# Patient Record
Sex: FEMALE | Race: WHITE
[De-identification: names, ages, dates, MRNs, and addresses within clinical notes are randomized per-mention and may not be internally consistent; named-entity substitution may affect disease eponyms.]

---

## 2017-01-26 ENCOUNTER — HOSPITAL ENCOUNTER (OUTPATIENT)
Dept: HOSPITAL 53 - M SMT | Age: 57
End: 2017-01-26
Attending: INTERNAL MEDICINE
Payer: COMMERCIAL

## 2017-01-26 DIAGNOSIS — Z94.0: Primary | ICD-10-CM

## 2017-01-26 DIAGNOSIS — N18.3: ICD-10-CM

## 2017-01-26 LAB
ALBUMIN SERPL BCG-MCNC: 3.8 GM/DL (ref 3.2–5.2)
ANION GAP SERPL CALC-SCNC: 6 MEQ/L (ref 8–16)
BASOPHILS # BLD AUTO: 0 K/MM3 (ref 0–0.2)
BASOPHILS NFR BLD AUTO: 1 % (ref 0–1)
BUN SERPL-MCNC: 24 MG/DL (ref 7–18)
CALCIUM SERPL-MCNC: 8.6 MG/DL (ref 8.5–10.1)
CHLORIDE SERPL-SCNC: 106 MEQ/L (ref 98–107)
CO2 SERPL-SCNC: 29 MEQ/L (ref 21–32)
CREAT SERPL-MCNC: 1.23 MG/DL (ref 0.55–1.02)
EOSINOPHIL # BLD AUTO: 0.1 K/MM3 (ref 0–0.5)
EOSINOPHIL NFR BLD AUTO: 3.3 % (ref 0–3)
ERYTHROCYTE [DISTWIDTH] IN BLOOD BY AUTOMATED COUNT: 13.7 % (ref 11.5–14.5)
GFR SERPL CREATININE-BSD FRML MDRD: 48.3 ML/MIN/{1.73_M2} (ref 51–?)
LARGE UNSTAINED CELL #: 0.1 K/MM3 (ref 0–0.4)
LARGE UNSTAINED CELL %: 2.9 % (ref 0–4)
LYMPHOCYTES # BLD AUTO: 0.8 K/MM3 (ref 1.5–4.5)
LYMPHOCYTES NFR BLD AUTO: 36.1 % (ref 24–44)
MCH RBC QN AUTO: 27.2 PG (ref 27–33)
MCHC RBC AUTO-ENTMCNC: 33 G/DL (ref 32–36.5)
MCV RBC AUTO: 82.4 FL (ref 80–96)
MONOCYTES # BLD AUTO: 0.1 K/MM3 (ref 0–0.8)
MONOCYTES NFR BLD AUTO: 4.9 % (ref 0–5)
NEUTROPHILS # BLD AUTO: 1.1 K/MM3 (ref 1.8–7.7)
NEUTROPHILS NFR BLD AUTO: 51.8 % (ref 36–66)
PHOSPHATE SERPL-MCNC: 3.4 MG/DL (ref 2.5–4.9)
PLATELET # BLD AUTO: 153 K/MM3 (ref 150–450)
POTASSIUM SERPL-SCNC: 4.4 MEQ/L (ref 3.5–5.1)
SODIUM SERPL-SCNC: 141 MEQ/L (ref 136–145)

## 2017-01-27 LAB
GLUCOSE SERPL-MCNC: 112 MG/DL (ref 70–105)
WBC # BLD AUTO: 2.2 K/MM3 (ref 4–10)

## 2017-03-23 ENCOUNTER — HOSPITAL ENCOUNTER (OUTPATIENT)
Dept: HOSPITAL 53 - M SMT | Age: 57
End: 2017-03-23
Attending: INTERNAL MEDICINE
Payer: COMMERCIAL

## 2017-03-23 DIAGNOSIS — N18.3: Primary | ICD-10-CM

## 2017-03-23 DIAGNOSIS — Z94.0: ICD-10-CM

## 2017-03-23 LAB
ALBUMIN SERPL BCG-MCNC: 3.9 GM/DL (ref 3.2–5.2)
ANION GAP SERPL CALC-SCNC: 6 MEQ/L (ref 8–16)
BASOPHILS # BLD AUTO: 0 K/MM3 (ref 0–0.2)
BASOPHILS NFR BLD AUTO: 0.3 % (ref 0–1)
BUN SERPL-MCNC: 25 MG/DL (ref 7–18)
CALCIUM SERPL-MCNC: 8.8 MG/DL (ref 8.5–10.1)
CHLORIDE SERPL-SCNC: 105 MEQ/L (ref 98–107)
CO2 SERPL-SCNC: 30 MEQ/L (ref 21–32)
CREAT SERPL-MCNC: 1.34 MG/DL (ref 0.55–1.02)
EOSINOPHIL # BLD AUTO: 0.1 K/MM3 (ref 0–0.5)
EOSINOPHIL NFR BLD AUTO: 3 % (ref 0–3)
ERYTHROCYTE [DISTWIDTH] IN BLOOD BY AUTOMATED COUNT: 12.9 % (ref 11.5–14.5)
GFR SERPL CREATININE-BSD FRML MDRD: 43.7 ML/MIN/{1.73_M2} (ref 51–?)
GLUCOSE SERPL-MCNC: 110 MG/DL (ref 70–105)
LARGE UNSTAINED CELL #: 0.1 K/MM3 (ref 0–0.4)
LARGE UNSTAINED CELL %: 4.7 % (ref 0–4)
LYMPHOCYTES # BLD AUTO: 1.1 K/MM3 (ref 1.5–4.5)
LYMPHOCYTES NFR BLD AUTO: 41.1 % (ref 24–44)
MCH RBC QN AUTO: 27.4 PG (ref 27–33)
MCHC RBC AUTO-ENTMCNC: 32 G/DL (ref 32–36.5)
MCV RBC AUTO: 85.7 FL (ref 80–96)
MONOCYTES # BLD AUTO: 0.2 K/MM3 (ref 0–0.8)
MONOCYTES NFR BLD AUTO: 5.9 % (ref 0–5)
NEUTROPHILS # BLD AUTO: 1.1 K/MM3 (ref 1.8–7.7)
NEUTROPHILS NFR BLD AUTO: 45 % (ref 36–66)
PHOSPHATE SERPL-MCNC: 3.8 MG/DL (ref 2.5–4.9)
PLATELET # BLD AUTO: 178 K/MM3 (ref 150–450)
POTASSIUM SERPL-SCNC: 4.8 MEQ/L (ref 3.5–5.1)
SODIUM SERPL-SCNC: 141 MEQ/L (ref 136–145)
WBC # BLD AUTO: 2.5 K/MM3 (ref 4–10)

## 2017-05-01 ENCOUNTER — HOSPITAL ENCOUNTER (OUTPATIENT)
Dept: HOSPITAL 53 - M SMT | Age: 57
End: 2017-05-01
Attending: INTERNAL MEDICINE
Payer: COMMERCIAL

## 2017-05-01 DIAGNOSIS — N18.3: Primary | ICD-10-CM

## 2017-05-01 DIAGNOSIS — Z94.0: ICD-10-CM

## 2017-05-01 LAB
ALBUMIN SERPL BCG-MCNC: 3.9 GM/DL (ref 3.2–5.2)
ANION GAP SERPL CALC-SCNC: 7 MEQ/L (ref 8–16)
BASOPHILS # BLD AUTO: 0 K/MM3 (ref 0–0.2)
BASOPHILS NFR BLD AUTO: 0.2 % (ref 0–1)
BUN SERPL-MCNC: 23 MG/DL (ref 7–18)
CALCIUM SERPL-MCNC: 8.3 MG/DL (ref 8.5–10.1)
CHLORIDE SERPL-SCNC: 106 MEQ/L (ref 98–107)
CO2 SERPL-SCNC: 27 MEQ/L (ref 21–32)
CREAT SERPL-MCNC: 1.48 MG/DL (ref 0.55–1.02)
EOSINOPHIL # BLD AUTO: 0 K/MM3 (ref 0–0.5)
EOSINOPHIL NFR BLD AUTO: 1.8 % (ref 0–3)
ERYTHROCYTE [DISTWIDTH] IN BLOOD BY AUTOMATED COUNT: 13.3 % (ref 11.5–14.5)
GFR SERPL CREATININE-BSD FRML MDRD: 39 ML/MIN/{1.73_M2} (ref 51–?)
GLUCOSE SERPL-MCNC: 105 MG/DL (ref 70–105)
LARGE UNSTAINED CELL #: 0.1 K/MM3 (ref 0–0.4)
LARGE UNSTAINED CELL %: 2.7 % (ref 0–4)
LYMPHOCYTES # BLD AUTO: 0.8 K/MM3 (ref 1.5–4.5)
LYMPHOCYTES NFR BLD AUTO: 34.4 % (ref 24–44)
MCH RBC QN AUTO: 29 PG (ref 27–33)
MCHC RBC AUTO-ENTMCNC: 33.5 G/DL (ref 32–36.5)
MCV RBC AUTO: 86.6 FL (ref 80–96)
MONOCYTES # BLD AUTO: 0.2 K/MM3 (ref 0–0.8)
MONOCYTES NFR BLD AUTO: 6.5 % (ref 0–5)
NEUTROPHILS # BLD AUTO: 1.2 K/MM3 (ref 1.8–7.7)
NEUTROPHILS NFR BLD AUTO: 54.5 % (ref 36–66)
PHOSPHATE SERPL-MCNC: 3.5 MG/DL (ref 2.5–4.9)
PLATELET # BLD AUTO: 146 K/MM3 (ref 150–450)
POTASSIUM SERPL-SCNC: 4.3 MEQ/L (ref 3.5–5.1)
SODIUM SERPL-SCNC: 140 MEQ/L (ref 136–145)
WBC # BLD AUTO: 2.3 K/MM3 (ref 4–10)

## 2017-06-29 ENCOUNTER — HOSPITAL ENCOUNTER (OUTPATIENT)
Dept: HOSPITAL 53 - M SMT | Age: 57
End: 2017-06-29
Attending: INTERNAL MEDICINE
Payer: COMMERCIAL

## 2017-06-29 DIAGNOSIS — N18.3: Primary | ICD-10-CM

## 2017-06-29 DIAGNOSIS — Z94.0: ICD-10-CM

## 2017-06-29 LAB
ADD MANUAL DIFFER: YES
ALBUMIN SERPL BCG-MCNC: 3.9 GM/DL (ref 3.2–5.2)
ANION GAP SERPL CALC-SCNC: 4 MEQ/L (ref 8–16)
BUN SERPL-MCNC: 25 MG/DL (ref 7–18)
CALCIUM SERPL-MCNC: 8.8 MG/DL (ref 8.5–10.1)
CHLORIDE SERPL-SCNC: 103 MEQ/L (ref 98–107)
CO2 SERPL-SCNC: 32 MEQ/L (ref 21–32)
CREAT SERPL-MCNC: 1.27 MG/DL (ref 0.55–1.02)
EOSINOPHIL NFR BLD MANUAL: 1 % (ref 0–5)
ERYTHROCYTE [DISTWIDTH] IN BLOOD BY AUTOMATED COUNT: 12.8 % (ref 11.5–14.5)
GFR SERPL CREATININE-BSD FRML MDRD: 46.5 ML/MIN/{1.73_M2} (ref 51–?)
GLUCOSE SERPL-MCNC: 84 MG/DL (ref 70–105)
MCH RBC QN AUTO: 28.5 PG (ref 27–33)
MCHC RBC AUTO-ENTMCNC: 33.3 G/DL (ref 32–36.5)
MCV RBC AUTO: 85.6 FL (ref 80–96)
PHOSPHATE SERPL-MCNC: 3.2 MG/DL (ref 2.5–4.9)
PLATELET # BLD AUTO: 172 K/MM3 (ref 150–450)
POTASSIUM SERPL-SCNC: 4.9 MEQ/L (ref 3.5–5.1)
RBC MORPH BLD: NORMAL
SODIUM SERPL-SCNC: 139 MEQ/L (ref 136–145)
WBC # BLD AUTO: 5.1 K/MM3 (ref 4–10)

## 2017-07-27 ENCOUNTER — HOSPITAL ENCOUNTER (OUTPATIENT)
Dept: HOSPITAL 53 - M SMT | Age: 57
End: 2017-07-27
Attending: INTERNAL MEDICINE
Payer: COMMERCIAL

## 2017-07-27 DIAGNOSIS — N18.3: Primary | ICD-10-CM

## 2017-07-27 DIAGNOSIS — Z94.0: ICD-10-CM

## 2017-07-27 LAB
ADD MANUAL DIFFER: YES
ALBUMIN SERPL BCG-MCNC: 3.7 GM/DL (ref 3.2–5.2)
ANION GAP SERPL CALC-SCNC: 5 MEQ/L (ref 8–16)
BUN SERPL-MCNC: 27 MG/DL (ref 7–18)
CALCIUM SERPL-MCNC: 8.6 MG/DL (ref 8.5–10.1)
CHLORIDE SERPL-SCNC: 101 MEQ/L (ref 98–107)
CO2 SERPL-SCNC: 30 MEQ/L (ref 21–32)
CREAT SERPL-MCNC: 1.36 MG/DL (ref 0.55–1.02)
DIFF SLIDE NUMBER: 154
EOSINOPHIL NFR BLD MANUAL: 3 % (ref 0–5)
ERYTHROCYTE [DISTWIDTH] IN BLOOD BY AUTOMATED COUNT: 12.9 % (ref 11.5–14.5)
GFR SERPL CREATININE-BSD FRML MDRD: 43 ML/MIN/{1.73_M2} (ref 51–?)
GLUCOSE SERPL-MCNC: 95 MG/DL (ref 70–105)
MCH RBC QN AUTO: 28.1 PG (ref 27–33)
MCHC RBC AUTO-ENTMCNC: 33 G/DL (ref 32–36.5)
MCV RBC AUTO: 85.1 FL (ref 80–96)
PHOSPHATE SERPL-MCNC: 3.6 MG/DL (ref 2.5–4.9)
PLATELET # BLD AUTO: 183 K/MM3 (ref 150–450)
POTASSIUM SERPL-SCNC: 4.9 MEQ/L (ref 3.5–5.1)
RBC MORPH BLD: NORMAL
SODIUM SERPL-SCNC: 136 MEQ/L (ref 136–145)
WBC # BLD AUTO: 2.6 K/MM3 (ref 4–10)

## 2017-09-21 ENCOUNTER — HOSPITAL ENCOUNTER (OUTPATIENT)
Dept: HOSPITAL 53 - M SMT | Age: 57
End: 2017-09-21
Attending: INTERNAL MEDICINE
Payer: COMMERCIAL

## 2017-09-21 DIAGNOSIS — N18.3: Primary | ICD-10-CM

## 2017-09-21 DIAGNOSIS — Z94.0: ICD-10-CM

## 2017-09-21 LAB
ALBUMIN SERPL BCG-MCNC: 3.7 GM/DL (ref 3.2–5.2)
ANION GAP SERPL CALC-SCNC: 6 MEQ/L (ref 8–16)
BASOPHILS # BLD AUTO: 0 K/MM3 (ref 0–0.2)
BASOPHILS NFR BLD AUTO: 0.7 % (ref 0–1)
BUN SERPL-MCNC: 26 MG/DL (ref 7–18)
CALCIUM SERPL-MCNC: 8.6 MG/DL (ref 8.5–10.1)
CHLORIDE SERPL-SCNC: 103 MEQ/L (ref 98–107)
CO2 SERPL-SCNC: 28 MEQ/L (ref 21–32)
CREAT SERPL-MCNC: 1.29 MG/DL (ref 0.55–1.02)
EOSINOPHIL # BLD AUTO: 0.1 K/MM3 (ref 0–0.5)
EOSINOPHIL NFR BLD AUTO: 3.1 % (ref 0–3)
ERYTHROCYTE [DISTWIDTH] IN BLOOD BY AUTOMATED COUNT: 13.1 % (ref 11.5–14.5)
GFR SERPL CREATININE-BSD FRML MDRD: 45.7 ML/MIN/{1.73_M2} (ref 51–?)
GLUCOSE SERPL-MCNC: 104 MG/DL (ref 70–105)
LARGE UNSTAINED CELL #: 0.1 K/MM3 (ref 0–0.4)
LARGE UNSTAINED CELL %: 4.3 % (ref 0–4)
LYMPHOCYTES # BLD AUTO: 0.9 K/MM3 (ref 1.5–4.5)
LYMPHOCYTES NFR BLD AUTO: 38.8 % (ref 24–44)
MCH RBC QN AUTO: 28.7 PG (ref 27–33)
MCHC RBC AUTO-ENTMCNC: 33.8 G/DL (ref 32–36.5)
MCV RBC AUTO: 85 FL (ref 80–96)
MONOCYTES # BLD AUTO: 0.1 K/MM3 (ref 0–0.8)
MONOCYTES NFR BLD AUTO: 6.2 % (ref 0–5)
NEUTROPHILS # BLD AUTO: 1.1 K/MM3 (ref 1.8–7.7)
NEUTROPHILS NFR BLD AUTO: 46.9 % (ref 36–66)
PHOSPHATE SERPL-MCNC: 2.9 MG/DL (ref 2.5–4.9)
PLATELET # BLD AUTO: 174 K/MM3 (ref 150–450)
POTASSIUM SERPL-SCNC: 5 MEQ/L (ref 3.5–5.1)
SODIUM SERPL-SCNC: 137 MEQ/L (ref 136–145)
WBC # BLD AUTO: 2.2 K/MM3 (ref 4–10)

## 2017-11-16 ENCOUNTER — HOSPITAL ENCOUNTER (OUTPATIENT)
Dept: HOSPITAL 53 - M LAB REF | Age: 57
End: 2017-11-16
Attending: INTERNAL MEDICINE
Payer: COMMERCIAL

## 2017-11-16 DIAGNOSIS — Z94.0: Primary | ICD-10-CM

## 2017-11-16 DIAGNOSIS — E03.9: ICD-10-CM

## 2017-11-16 LAB — T4 FREE SERPL-MCNC: 0.97 NG/DL (ref 0.76–1.46)

## 2018-01-11 ENCOUNTER — HOSPITAL ENCOUNTER (OUTPATIENT)
Dept: HOSPITAL 53 - M SMT | Age: 58
End: 2018-01-11
Attending: INTERNAL MEDICINE
Payer: COMMERCIAL

## 2018-01-11 DIAGNOSIS — Z94.0: Primary | ICD-10-CM

## 2018-01-11 DIAGNOSIS — N18.3: ICD-10-CM

## 2018-01-11 LAB
ALBUMIN: 3.8 GM/DL (ref 3.2–5.2)
ANION GAP: 9 MEQ/L (ref 8–16)
APPEARANCE, URINE: CLEAR
BACTERIA UR QL AUTO: NEGATIVE
BASO #: 0 10^3/UL (ref 0–0.2)
BASO %: 0.6 % (ref 0–1)
BILIRUBIN, URINE AUTO: NEGATIVE
BLOOD UREA NITROGEN: 31 MG/DL (ref 7–18)
BLOOD, URINE BLOOD: NEGATIVE
CALCIUM LEVEL: 8.7 MG/DL (ref 8.5–10.1)
CARBON DIOXIDE LEVEL: 27 MEQ/L (ref 21–32)
CHLORIDE LEVEL: 104 MEQ/L (ref 98–107)
CREATININE FOR GFR: 1.39 MG/DL (ref 0.55–1.02)
EOS #: 0.1 10^3/UL (ref 0–0.5)
EOSINOPHIL NFR BLD AUTO: 2.3 % (ref 0–3)
GFR SERPL CREATININE-BSD FRML MDRD: 41.8 ML/MIN/{1.73_M2} (ref 51–?)
GLUCOSE, FASTING: 102 MG/DL (ref 70–105)
GLUCOSE, URINE (UA) AUTO: NEGATIVE MG/DL
HEMATOCRIT: 36.9 % (ref 36–47)
HEMOGLOBIN: 11.9 G/DL (ref 12–16)
IMMATURE GRANULOCYTE #: 0 10^3/UL (ref 0–0)
IMMATURE GRANULOCYTE %: 0.3 % (ref 0–0)
KETONE, URINE AUTO: NEGATIVE MG/DL
LEUKOCYTE ESTERASE UR QL STRIP.AUTO: NEGATIVE
LYMPH #: 1.8 10^3/UL (ref 1.5–4.5)
LYMPH %: 52.9 % (ref 24–44)
MEAN CORPUSCULAR HEMOGLOBIN: 27.7 PG (ref 27–33)
MEAN CORPUSCULAR HGB CONC: 32.2 G/DL (ref 32–36.5)
MEAN CORPUSCULAR VOLUME: 86 FL (ref 80–96)
MONO #: 0.3 10^3/UL (ref 0–0.8)
MONO %: 7.8 % (ref 0–5)
NEUTROPHILS #: 1.2 10^3/UL (ref 1.8–7.7)
NEUTROPHILS %: 36.1 % (ref 36–66)
NITRITE, URINE AUTO: NEGATIVE
NRBC BLD AUTO-RTO: 0 % (ref 0–0)
PH,URINE: 6 UNITS (ref 5–9)
PHOSPHORUS LEVEL: 3.5 MG/DL (ref 2.5–4.9)
PLATELET COUNT, AUTOMATED: 186 10^3/UL (ref 150–450)
POTASSIUM SERUM: 4.8 MEQ/L (ref 3.5–5.1)
PROT UR QL STRIP.AUTO: NEGATIVE MG/DL
RBC, URINE AUTO: 1 /HPF (ref 0–3)
RED BLOOD COUNT: 4.29 10^6/UL (ref 4–5.4)
RED CELL DISTRIBUTION WIDTH: 13 % (ref 11.5–14.5)
SODIUM LEVEL: 140 MEQ/L (ref 136–145)
SPECIFIC GRAVITY URINE AUTO: 1.02 (ref 1–1.03)
SQUAMOUS #/AREA URNS AUTO: 0 /HPF (ref 0–6)
UROBILINOGEN, URINE AUTO: 0.2 MG/DL (ref 0–2)
WBC, URINE AUTO: 0 /HPF (ref 0–3)
WHITE BLOOD COUNT: 3.4 10^3/UL (ref 4–10)

## 2018-01-11 PROCEDURE — 80069 RENAL FUNCTION PANEL: CPT

## 2018-01-13 LAB — FK 506 (TACROLIMUS) LABCORP: 6.5 NG/ML (ref 2–20)

## 2018-02-15 ENCOUNTER — HOSPITAL ENCOUNTER (OUTPATIENT)
Dept: HOSPITAL 53 - M LAB REF | Age: 58
End: 2018-02-15
Attending: INTERNAL MEDICINE
Payer: COMMERCIAL

## 2018-02-15 DIAGNOSIS — Z94.0: Primary | ICD-10-CM

## 2018-02-17 LAB — FK 506 (TACROLIMUS) LABCORP: 7 NG/ML (ref 2–20)

## 2018-03-22 ENCOUNTER — HOSPITAL ENCOUNTER (OUTPATIENT)
Dept: HOSPITAL 53 - M SMT | Age: 58
End: 2018-03-22
Attending: INTERNAL MEDICINE
Payer: COMMERCIAL

## 2018-03-22 DIAGNOSIS — N18.3: Primary | ICD-10-CM

## 2018-03-22 DIAGNOSIS — Z94.0: ICD-10-CM

## 2018-03-22 LAB
ALBUMIN: 3.6 GM/DL (ref 3.2–5.2)
ANION GAP: 7 MEQ/L (ref 8–16)
APPEARANCE, URINE: CLEAR
BACTERIA UR QL AUTO: NEGATIVE
BASO #: 0 10^3/UL (ref 0–0.2)
BASO %: 1.1 % (ref 0–1)
BILIRUBIN, URINE AUTO: NEGATIVE
BLOOD UREA NITROGEN: 25 MG/DL (ref 7–18)
BLOOD, URINE BLOOD: NEGATIVE
CALCIUM LEVEL: 8.2 MG/DL (ref 8.5–10.1)
CARBON DIOXIDE LEVEL: 28 MEQ/L (ref 21–32)
CHLORIDE LEVEL: 104 MEQ/L (ref 98–107)
CREATININE FOR GFR: 1.27 MG/DL (ref 0.55–1.3)
EOS #: 0.1 10^3/UL (ref 0–0.5)
EOSINOPHIL NFR BLD AUTO: 2.8 % (ref 0–3)
GFR SERPL CREATININE-BSD FRML MDRD: 46.3 ML/MIN/{1.73_M2} (ref 51–?)
GLUCOSE, FASTING: 96 MG/DL (ref 70–100)
GLUCOSE, URINE (UA) AUTO: NEGATIVE MG/DL
HEMATOCRIT: 36.6 % (ref 36–47)
HEMOGLOBIN: 11.4 G/DL (ref 12–16)
IMMATURE GRANULOCYTE %: 0.4 % (ref 0–3)
KETONE, URINE AUTO: NEGATIVE MG/DL
LEUKOCYTE ESTERASE UR QL STRIP.AUTO: NEGATIVE
LYMPH #: 0.9 10^3/UL (ref 1.5–4.5)
LYMPH %: 32.9 % (ref 24–44)
MEAN CORPUSCULAR HEMOGLOBIN: 26.6 PG (ref 27–33)
MEAN CORPUSCULAR HGB CONC: 31.1 G/DL (ref 32–36.5)
MEAN CORPUSCULAR VOLUME: 85.5 FL (ref 80–96)
MONO #: 0.2 10^3/UL (ref 0–0.8)
MONO %: 6.7 % (ref 0–5)
NEUTROPHILS #: 1.6 10^3/UL (ref 1.8–7.7)
NEUTROPHILS %: 56.1 % (ref 36–66)
NITRITE, URINE AUTO: NEGATIVE
NRBC BLD AUTO-RTO: 0 % (ref 0–0)
PH,URINE: 5 UNITS (ref 5–9)
PHOSPHORUS LEVEL: 2.9 MG/DL (ref 2.5–4.9)
PLATELET COUNT, AUTOMATED: 197 10^3/UL (ref 150–450)
POTASSIUM SERUM: 4.3 MEQ/L (ref 3.5–5.1)
PROT UR QL STRIP.AUTO: NEGATIVE MG/DL
RBC, URINE AUTO: 0 /HPF (ref 0–3)
RED BLOOD COUNT: 4.28 10^6/UL (ref 4–5.4)
RED CELL DISTRIBUTION WIDTH: 13.4 % (ref 11.5–14.5)
SODIUM LEVEL: 139 MEQ/L (ref 136–145)
SPECIFIC GRAVITY URINE AUTO: 1 (ref 1–1.03)
SQUAMOUS #/AREA URNS AUTO: 0 /HPF (ref 0–6)
UROBILINOGEN, URINE AUTO: 0.2 MG/DL (ref 0–2)
WBC, URINE AUTO: 0 /HPF (ref 0–3)
WHITE BLOOD COUNT: 2.8 10^3/UL (ref 4–10)

## 2018-03-22 PROCEDURE — 80069 RENAL FUNCTION PANEL: CPT

## 2018-03-25 LAB — FK 506 (TACROLIMUS) LABCORP: 4.8 NG/ML (ref 2–20)

## 2018-05-21 ENCOUNTER — HOSPITAL ENCOUNTER (OUTPATIENT)
Dept: HOSPITAL 53 - M SMT | Age: 58
End: 2018-05-21
Attending: INTERNAL MEDICINE
Payer: COMMERCIAL

## 2018-05-21 DIAGNOSIS — Z94.0: Primary | ICD-10-CM

## 2018-05-21 DIAGNOSIS — D63.1: ICD-10-CM

## 2018-05-21 LAB
ALBUMIN: 3.8 GM/DL (ref 3.2–5.2)
ANION GAP: 8 MEQ/L (ref 8–16)
APPEARANCE, URINE: CLEAR
BACTERIA UR QL AUTO: NEGATIVE
BASO #: 0 10^3/UL (ref 0–0.2)
BASO %: 1.1 % (ref 0–1)
BILIRUBIN, URINE AUTO: NEGATIVE
BLOOD UREA NITROGEN: 27 MG/DL (ref 7–18)
BLOOD, URINE BLOOD: NEGATIVE
CALCIUM LEVEL: 8.4 MG/DL (ref 8.5–10.1)
CARBON DIOXIDE LEVEL: 27 MEQ/L (ref 21–32)
CHLORIDE LEVEL: 104 MEQ/L (ref 98–107)
CREATININE FOR GFR: 1.39 MG/DL (ref 0.55–1.3)
EOS #: 0.1 10^3/UL (ref 0–0.5)
EOSINOPHIL NFR BLD AUTO: 2.2 % (ref 0–3)
GFR SERPL CREATININE-BSD FRML MDRD: 41.8 ML/MIN/{1.73_M2} (ref 51–?)
GLUCOSE, FASTING: 105 MG/DL (ref 70–100)
GLUCOSE, URINE (UA) AUTO: NEGATIVE MG/DL
HEMATOCRIT: 36.4 % (ref 36–47)
HEMOGLOBIN: 11.6 G/DL (ref 12–15.5)
IMMATURE GRANULOCYTE %: 0 % (ref 0–3)
KETONE, URINE AUTO: NEGATIVE MG/DL
LEUKOCYTE ESTERASE UR QL STRIP.AUTO: NEGATIVE
LYMPH #: 0.9 10^3/UL (ref 1.5–4.5)
LYMPH %: 33.5 % (ref 24–44)
MEAN CORPUSCULAR HEMOGLOBIN: 27.2 PG (ref 27–33)
MEAN CORPUSCULAR HGB CONC: 31.9 G/DL (ref 32–36.5)
MEAN CORPUSCULAR VOLUME: 85.2 FL (ref 80–96)
MONO #: 0.2 10^3/UL (ref 0–0.8)
MONO %: 8.6 % (ref 0–5)
MUCUS, URINE: (no result)
NEUTROPHILS #: 1.5 10^3/UL (ref 1.8–7.7)
NEUTROPHILS %: 54.6 % (ref 36–66)
NITRITE, URINE AUTO: NEGATIVE
NRBC BLD AUTO-RTO: 0 % (ref 0–0)
PH,URINE: 5 UNITS (ref 5–9)
PHOSPHORUS LEVEL: 3.4 MG/DL (ref 2.5–4.9)
PLATELET COUNT, AUTOMATED: 170 10^3/UL (ref 150–450)
POTASSIUM SERUM: 4.4 MEQ/L (ref 3.5–5.1)
PROT UR QL STRIP.AUTO: NEGATIVE MG/DL
RBC, URINE AUTO: 1 /HPF (ref 0–3)
RED BLOOD COUNT: 4.27 10^6/UL (ref 4–5.4)
RED CELL DISTRIBUTION WIDTH: 13.1 % (ref 11.5–14.5)
SODIUM LEVEL: 139 MEQ/L (ref 136–145)
SPECIFIC GRAVITY URINE AUTO: 1.01 (ref 1–1.03)
SQUAMOUS #/AREA URNS AUTO: 0 /HPF (ref 0–6)
UROBILINOGEN, URINE AUTO: 0.2 MG/DL (ref 0–2)
WBC, URINE AUTO: 0 /HPF (ref 0–3)
WHITE BLOOD COUNT: 2.7 10^3/UL (ref 4–10)

## 2018-05-21 PROCEDURE — 80069 RENAL FUNCTION PANEL: CPT

## 2018-05-23 LAB — FK 506 (TACROLIMUS) LABCORP: 6.1 NG/ML (ref 2–20)

## 2018-07-09 ENCOUNTER — HOSPITAL ENCOUNTER (OUTPATIENT)
Dept: HOSPITAL 53 - M SMT | Age: 58
End: 2018-07-09
Attending: INTERNAL MEDICINE
Payer: COMMERCIAL

## 2018-07-09 DIAGNOSIS — Z94.0: Primary | ICD-10-CM

## 2018-07-09 DIAGNOSIS — D63.1: ICD-10-CM

## 2018-07-09 LAB
ALBUMIN: 3.8 GM/DL (ref 3.2–5.2)
ANION GAP: 8 MEQ/L (ref 8–16)
APPEARANCE, URINE: CLEAR
BACTERIA UR QL AUTO: NEGATIVE
BASO #: 0 10^3/UL (ref 0–0.2)
BASO %: 1.1 % (ref 0–1)
BILIRUBIN, URINE AUTO: NEGATIVE
BLOOD UREA NITROGEN: 31 MG/DL (ref 7–18)
BLOOD, URINE BLOOD: NEGATIVE
CALCIUM LEVEL: 8.6 MG/DL (ref 8.5–10.1)
CARBON DIOXIDE LEVEL: 27 MEQ/L (ref 21–32)
CHLORIDE LEVEL: 107 MEQ/L (ref 98–107)
CREATININE FOR GFR: 1.44 MG/DL (ref 0.55–1.3)
EOS #: 0.1 10^3/UL (ref 0–0.5)
EOSINOPHIL NFR BLD AUTO: 2.7 % (ref 0–3)
GFR SERPL CREATININE-BSD FRML MDRD: 40.1 ML/MIN/{1.73_M2} (ref 51–?)
GLUCOSE, FASTING: 111 MG/DL (ref 70–100)
GLUCOSE, URINE (UA) AUTO: NEGATIVE MG/DL
HEMATOCRIT: 35.3 % (ref 36–47)
HEMOGLOBIN: 11.5 G/DL (ref 12–15.5)
IMMATURE GRANULOCYTE %: 0 % (ref 0–3)
KETONE, URINE AUTO: NEGATIVE MG/DL
LEUKOCYTE ESTERASE UR QL STRIP.AUTO: NEGATIVE
LYMPH #: 0.9 10^3/UL (ref 1.5–4.5)
LYMPH %: 33.3 % (ref 24–44)
MEAN CORPUSCULAR HEMOGLOBIN: 27.3 PG (ref 27–33)
MEAN CORPUSCULAR HGB CONC: 32.6 G/DL (ref 32–36.5)
MEAN CORPUSCULAR VOLUME: 83.6 FL (ref 80–96)
MONO #: 0.2 10^3/UL (ref 0–0.8)
MONO %: 7.7 % (ref 0–5)
NEUTROPHILS #: 1.4 10^3/UL (ref 1.8–7.7)
NEUTROPHILS %: 55.2 % (ref 36–66)
NITRITE, URINE AUTO: NEGATIVE
NRBC BLD AUTO-RTO: 0 % (ref 0–0)
PH,URINE: 5 UNITS (ref 5–9)
PHOSPHORUS LEVEL: 3.9 MG/DL (ref 2.5–4.9)
PLATELET COUNT, AUTOMATED: 182 10^3/UL (ref 150–450)
POTASSIUM SERUM: 4.8 MEQ/L (ref 3.5–5.1)
PROT UR QL STRIP.AUTO: NEGATIVE MG/DL
RBC, URINE AUTO: 0 /HPF (ref 0–3)
RED BLOOD COUNT: 4.22 10^6/UL (ref 4–5.4)
RED CELL DISTRIBUTION WIDTH: 13.2 % (ref 11.5–14.5)
SODIUM LEVEL: 142 MEQ/L (ref 136–145)
SPECIFIC GRAVITY URINE AUTO: 1.01 (ref 1–1.03)
SQUAMOUS #/AREA URNS AUTO: 0 /HPF (ref 0–6)
UROBILINOGEN, URINE AUTO: 0.2 MG/DL (ref 0–2)
WBC, URINE AUTO: 0 /HPF (ref 0–3)
WHITE BLOOD COUNT: 2.6 10^3/UL (ref 4–10)

## 2018-07-09 PROCEDURE — 80069 RENAL FUNCTION PANEL: CPT

## 2018-07-12 LAB — FK 506 (TACROLIMUS) LABCORP: 5.6 NG/ML (ref 2–20)

## 2018-11-01 ENCOUNTER — HOSPITAL ENCOUNTER (OUTPATIENT)
Dept: HOSPITAL 53 - M LAB REF | Age: 58
End: 2018-11-01
Attending: INTERNAL MEDICINE
Payer: COMMERCIAL

## 2018-11-01 DIAGNOSIS — Z94.0: Primary | ICD-10-CM

## 2018-11-01 DIAGNOSIS — Z48.22: ICD-10-CM

## 2018-11-01 LAB
ALBUMIN: 3.6 GM/DL (ref 3.2–5.2)
ANION GAP: 6 MEQ/L (ref 8–16)
APPEARANCE, URINE: CLEAR
BACTERIA UR QL AUTO: NEGATIVE
BASO #: 0 10^3/UL (ref 0–0.2)
BASO %: 0.7 % (ref 0–1)
BILIRUBIN, URINE AUTO: NEGATIVE
BLOOD UREA NITROGEN: 22 MG/DL (ref 7–18)
BLOOD, URINE BLOOD: NEGATIVE
CALCIUM LEVEL: 8.4 MG/DL (ref 8.5–10.1)
CARBON DIOXIDE LEVEL: 29 MEQ/L (ref 21–32)
CHLORIDE LEVEL: 103 MEQ/L (ref 98–107)
CREATININE FOR GFR: 1.46 MG/DL (ref 0.55–1.3)
EOS #: 0.1 10^3/UL (ref 0–0.5)
EOSINOPHIL NFR BLD AUTO: 3.4 % (ref 0–3)
GFR SERPL CREATININE-BSD FRML MDRD: 39.5 ML/MIN/{1.73_M2} (ref 51–?)
GLUCOSE, FASTING: 137 MG/DL (ref 70–100)
GLUCOSE, URINE (UA) AUTO: NEGATIVE MG/DL
HEMATOCRIT: 37.2 % (ref 36–47)
HEMOGLOBIN: 11.7 G/DL (ref 12–15.5)
IMMATURE GRANULOCYTE %: 0.3 % (ref 0–3)
KETONE, URINE AUTO: NEGATIVE MG/DL
LEUKOCYTE ESTERASE UR QL STRIP.AUTO: NEGATIVE
LYMPH #: 1.3 10^3/UL (ref 1.5–4.5)
LYMPH %: 43.7 % (ref 24–44)
MEAN CORPUSCULAR HEMOGLOBIN: 27.2 PG (ref 27–33)
MEAN CORPUSCULAR HGB CONC: 31.5 G/DL (ref 32–36.5)
MEAN CORPUSCULAR VOLUME: 86.5 FL (ref 80–96)
MONO #: 0.2 10^3/UL (ref 0–0.8)
MONO %: 7.5 % (ref 0–5)
NEUTROPHILS #: 1.3 10^3/UL (ref 1.8–7.7)
NEUTROPHILS %: 44.4 % (ref 36–66)
NITRITE, URINE AUTO: NEGATIVE
NRBC BLD AUTO-RTO: 0 % (ref 0–0)
PH,URINE: 6 UNITS (ref 5–9)
PHOSPHORUS LEVEL: 3.2 MG/DL (ref 2.5–4.9)
PLATELET COUNT, AUTOMATED: 180 10^3/UL (ref 150–450)
POTASSIUM SERUM: 4.5 MEQ/L (ref 3.5–5.1)
PROT UR QL STRIP.AUTO: NEGATIVE MG/DL
RBC, URINE AUTO: 0 /HPF (ref 0–3)
RED BLOOD COUNT: 4.3 10^6/UL (ref 4–5.4)
RED CELL DISTRIBUTION WIDTH: 13.1 % (ref 11.5–14.5)
SODIUM LEVEL: 138 MEQ/L (ref 136–145)
SPECIFIC GRAVITY URINE AUTO: 1.01 (ref 1–1.03)
SQUAMOUS #/AREA URNS AUTO: 0 /HPF (ref 0–6)
UROBILINOGEN, URINE AUTO: 0.2 MG/DL (ref 0–2)
WBC, URINE AUTO: 0 /HPF (ref 0–3)
WHITE BLOOD COUNT: 3 10^3/UL (ref 4–10)

## 2018-11-01 PROCEDURE — 80069 RENAL FUNCTION PANEL: CPT

## 2018-11-03 LAB — FK 506 (TACROLIMUS) LABCORP: 7.1 NG/ML (ref 2–20)

## 2018-12-06 ENCOUNTER — HOSPITAL ENCOUNTER (OUTPATIENT)
Dept: HOSPITAL 53 - M SMT | Age: 58
End: 2018-12-06
Attending: SPECIALIST
Payer: COMMERCIAL

## 2018-12-06 ENCOUNTER — HOSPITAL ENCOUNTER (OUTPATIENT)
Dept: HOSPITAL 53 - M SMT | Age: 58
End: 2018-12-06
Attending: INTERNAL MEDICINE
Payer: COMMERCIAL

## 2018-12-06 DIAGNOSIS — D50.9: ICD-10-CM

## 2018-12-06 DIAGNOSIS — Z94.0: ICD-10-CM

## 2018-12-06 DIAGNOSIS — Z01.419: Primary | ICD-10-CM

## 2018-12-06 DIAGNOSIS — Z48.22: Primary | ICD-10-CM

## 2018-12-06 LAB
ALBUMIN/GLOBULIN RATIO: 1.24 (ref 1–1.93)
ALBUMIN: 3.6 GM/DL (ref 3.2–5.2)
ALKALINE PHOSPHATASE: 56 U/L (ref 45–117)
ALT SERPL W P-5'-P-CCNC: 24 U/L (ref 12–78)
ANION GAP: 5 MEQ/L (ref 8–16)
APPEARANCE, URINE: CLEAR
AST SERPL-CCNC: 15 U/L (ref 7–37)
BACTERIA UR QL AUTO: NEGATIVE
BASO #: 0 10^3/UL (ref 0–0.2)
BASO %: 1.3 % (ref 0–1)
BILIRUBIN, URINE AUTO: NEGATIVE
BILIRUBIN,TOTAL: 0.4 MG/DL (ref 0.2–1)
BLOOD UREA NITROGEN: 24 MG/DL (ref 7–18)
BLOOD, URINE BLOOD: NEGATIVE
CALCIUM LEVEL: 8.2 MG/DL (ref 8.5–10.1)
CARBON DIOXIDE LEVEL: 30 MEQ/L (ref 21–32)
CHLORIDE LEVEL: 102 MEQ/L (ref 98–107)
CHOLEST SERPL-MCNC: 264 MG/DL (ref ?–200)
CHOLESTEROL RISK RATIO: 4.19 (ref ?–5)
CREATININE FOR GFR: 1.4 MG/DL (ref 0.55–1.3)
EOS #: 0.1 10^3/UL (ref 0–0.5)
EOSINOPHIL NFR BLD AUTO: 3.4 % (ref 0–3)
FREE T4: 1.02 NG/DL (ref 0.76–1.46)
GFR SERPL CREATININE-BSD FRML MDRD: 41.4 ML/MIN/{1.73_M2} (ref 51–?)
GLUCOSE, FASTING: 101 MG/DL (ref 70–100)
GLUCOSE, URINE (UA) AUTO: NEGATIVE MG/DL
HDLC SERPL-MCNC: 63 MG/DL (ref 40–?)
HEMATOCRIT: 36.8 % (ref 36–47)
HEMOGLOBIN: 11.7 G/DL (ref 12–15.5)
IMMATURE GRANULOCYTE %: 0 % (ref 0–3)
IRON (FE): 58 UG/DL (ref 50–170)
IRON SATN MFR SERPL: 24.2 % (ref 13.2–45)
KETONE, URINE AUTO: NEGATIVE MG/DL
LDL CHOLESTEROL: 176 MG/DL (ref ?–100)
LEUKOCYTE ESTERASE UR QL STRIP.AUTO: NEGATIVE
LYMPH #: 0.9 10^3/UL (ref 1.5–4.5)
LYMPH %: 37 % (ref 24–44)
MEAN CORPUSCULAR HEMOGLOBIN: 27.2 PG (ref 27–33)
MEAN CORPUSCULAR HGB CONC: 31.8 G/DL (ref 32–36.5)
MEAN CORPUSCULAR VOLUME: 85.6 FL (ref 80–96)
MONO #: 0.2 10^3/UL (ref 0–0.8)
MONO %: 8.4 % (ref 0–5)
MUCUS, URINE: (no result)
NEUTROPHILS #: 1.2 10^3/UL (ref 1.8–7.7)
NEUTROPHILS %: 49.9 % (ref 36–66)
NITRITE, URINE AUTO: NEGATIVE
NONHDLC SERPL-MCNC: 201 MG/DL
NRBC BLD AUTO-RTO: 0 % (ref 0–0)
PH,URINE: 7 UNITS (ref 5–9)
PLATELET COUNT, AUTOMATED: 193 10^3/UL (ref 150–450)
POTASSIUM SERUM: 4.7 MEQ/L (ref 3.5–5.1)
PROT UR QL STRIP.AUTO: NEGATIVE MG/DL
RBC, URINE AUTO: 0 /HPF (ref 0–3)
RED BLOOD COUNT: 4.3 10^6/UL (ref 4–5.4)
RED CELL DISTRIBUTION WIDTH: 13.2 % (ref 11.5–14.5)
SODIUM LEVEL: 137 MEQ/L (ref 136–145)
SPECIFIC GRAVITY URINE AUTO: 1.01 (ref 1–1.03)
SQUAMOUS #/AREA URNS AUTO: 0 /HPF (ref 0–6)
THYROID STIMULATING HORMONE: 2.41 UIU/ML (ref 0.36–3.74)
TOTAL IRON BINDING CAPACITY: 240 UG/DL (ref 250–450)
TOTAL PROTEIN: 6.5 GM/DL (ref 6.4–8.2)
TRIGLYCERIDES LEVEL: 125 MG/DL (ref ?–150)
UROBILINOGEN, URINE AUTO: 0.2 MG/DL (ref 0–2)
WBC, URINE AUTO: 0 /HPF (ref 0–3)
WHITE BLOOD COUNT: 2.4 10^3/UL (ref 4–10)

## 2018-12-06 PROCEDURE — 82306 VITAMIN D 25 HYDROXY: CPT

## 2018-12-06 PROCEDURE — 83550 IRON BINDING TEST: CPT

## 2018-12-07 LAB
25(OH)D3 SERPL-MCNC: 31.1 NG/ML (ref 30–100)
ALBUMIN/GLOBULIN RATIO: 1.39 (ref 1–1.93)
ALBUMIN: 3.9 GM/DL (ref 3.2–5.2)
ALKALINE PHOSPHATASE: 54 U/L (ref 45–117)
ALT SERPL W P-5'-P-CCNC: 21 U/L (ref 12–78)
ANION GAP: 8 MEQ/L (ref 8–16)
AST SERPL-CCNC: 16 U/L (ref 7–37)
BASO #: 0 10^3/UL (ref 0–0.2)
BASO %: 1.3 % (ref 0–1)
BILIRUBIN,TOTAL: 0.4 MG/DL (ref 0.2–1)
BLOOD UREA NITROGEN: 26 MG/DL (ref 7–18)
CALCIUM LEVEL: 8.7 MG/DL (ref 8.5–10.1)
CARBON DIOXIDE LEVEL: 27 MEQ/L (ref 21–32)
CHLORIDE LEVEL: 101 MEQ/L (ref 98–107)
CORTISOL AM: 16.1 UG/DL (ref 4.3–22.4)
CREATININE FOR GFR: 1.4 MG/DL (ref 0.55–1.3)
EOS #: 0.1 10^3/UL (ref 0–0.5)
EOSINOPHIL NFR BLD AUTO: 3.4 % (ref 0–3)
FERRITIN: 49 NG/ML (ref 8–252)
FREE T4: 1.02 NG/DL (ref 0.76–1.46)
GFR SERPL CREATININE-BSD FRML MDRD: 41.4 ML/MIN/{1.73_M2} (ref 51–?)
GLUCOSE, FASTING: 106 MG/DL (ref 70–100)
HEMATOCRIT: 36.8 % (ref 36–47)
HEMOGLOBIN: 11.7 G/DL (ref 12–15.5)
IMMATURE GRANULOCYTE %: 0 % (ref 0–3)
IRON (FE): 58 UG/DL (ref 50–170)
IRON SATN MFR SERPL: 23.2 % (ref 13.2–45)
LYMPH #: 0.9 10^3/UL (ref 1.5–4.5)
LYMPH %: 37 % (ref 24–44)
MEAN CORPUSCULAR HEMOGLOBIN: 27.2 PG (ref 27–33)
MEAN CORPUSCULAR HGB CONC: 31.8 G/DL (ref 32–36.5)
MEAN CORPUSCULAR VOLUME: 85.6 FL (ref 80–96)
MONO #: 0.2 10^3/UL (ref 0–0.8)
MONO %: 8.4 % (ref 0–5)
NEUTROPHILS #: 1.2 10^3/UL (ref 1.8–7.7)
NEUTROPHILS %: 49.9 % (ref 36–66)
NRBC BLD AUTO-RTO: 0 % (ref 0–0)
PHOSPHORUS LEVEL: 3.5 MG/DL (ref 2.5–4.9)
PLATELET COUNT, AUTOMATED: 193 10^3/UL (ref 150–450)
POTASSIUM SERUM: 4.7 MEQ/L (ref 3.5–5.1)
RED BLOOD COUNT: 4.3 10^6/UL (ref 4–5.4)
RED CELL DISTRIBUTION WIDTH: 13.2 % (ref 11.5–14.5)
SODIUM LEVEL: 136 MEQ/L (ref 136–145)
THYROID STIMULATING HORMONE: 2.59 UIU/ML (ref 0.36–3.74)
TOTAL IRON BINDING CAPACITY: 250 UG/DL (ref 250–450)
TOTAL PROTEIN: 6.7 GM/DL (ref 6.4–8.2)
WHITE BLOOD COUNT: 2.4 10^3/UL (ref 4–10)

## 2018-12-15 LAB
BK VIRUS BLOOD PCR2: (no result)
FK 506 (TACROLIMUS) LABCORP: 8.2 NG/ML (ref 2–20)
LOG10 CMV QN DNA P1: (no result)

## 2019-01-03 ENCOUNTER — HOSPITAL ENCOUNTER (OUTPATIENT)
Dept: HOSPITAL 53 - M LAB REF | Age: 59
End: 2019-01-03
Attending: INTERNAL MEDICINE
Payer: COMMERCIAL

## 2019-01-03 DIAGNOSIS — Z94.0: Primary | ICD-10-CM

## 2019-01-22 ENCOUNTER — HOSPITAL ENCOUNTER (OUTPATIENT)
Dept: HOSPITAL 53 - M SMT | Age: 59
End: 2019-01-22
Attending: INTERNAL MEDICINE
Payer: COMMERCIAL

## 2019-01-22 DIAGNOSIS — Z94.0: Primary | ICD-10-CM

## 2019-01-22 LAB
ALBUMIN SERPL BCG-MCNC: 3.8 GM/DL (ref 3.2–5.2)
APPEARANCE UR: CLEAR
BACTERIA UR QL AUTO: NEGATIVE
BASOPHILS # BLD AUTO: 0 10^3/UL (ref 0–0.2)
BASOPHILS NFR BLD AUTO: 1.3 % (ref 0–1)
BILIRUB UR QL STRIP.AUTO: NEGATIVE
BUN SERPL-MCNC: 20 MG/DL (ref 7–18)
CALCIUM SERPL-MCNC: 8.3 MG/DL (ref 8.5–10.1)
CHLORIDE SERPL-SCNC: 104 MEQ/L (ref 98–107)
CO2 SERPL-SCNC: 27 MEQ/L (ref 21–32)
CREAT SERPL-MCNC: 1.31 MG/DL (ref 0.55–1.3)
EOSINOPHIL # BLD AUTO: 0.1 10^3/UL (ref 0–0.5)
EOSINOPHIL NFR BLD AUTO: 4.2 % (ref 0–3)
GFR SERPL CREATININE-BSD FRML MDRD: 44.6 ML/MIN/{1.73_M2} (ref 51–?)
GLUCOSE SERPL-MCNC: 98 MG/DL (ref 70–100)
GLUCOSE UR QL STRIP.AUTO: NEGATIVE MG/DL
HCT VFR BLD AUTO: 36.4 % (ref 36–47)
HGB BLD-MCNC: 11.5 G/DL (ref 12–15.5)
HGB UR QL STRIP.AUTO: NEGATIVE
KETONES UR QL STRIP.AUTO: NEGATIVE MG/DL
LEUKOCYTE ESTERASE UR QL STRIP.AUTO: NEGATIVE
LYMPHOCYTES # BLD AUTO: 0.8 10^3/UL (ref 1.5–4.5)
LYMPHOCYTES NFR BLD AUTO: 33.9 % (ref 24–44)
MCH RBC QN AUTO: 27.1 PG (ref 27–33)
MCHC RBC AUTO-ENTMCNC: 31.6 G/DL (ref 32–36.5)
MCV RBC AUTO: 85.6 FL (ref 80–96)
MONOCYTES # BLD AUTO: 0.2 10^3/UL (ref 0–0.8)
MONOCYTES NFR BLD AUTO: 10.2 % (ref 0–5)
MUCOUS THREADS URNS QL MICRO: (no result)
NEUTROPHILS # BLD AUTO: 1.2 10^3/UL (ref 1.8–7.7)
NEUTROPHILS NFR BLD AUTO: 50.4 % (ref 36–66)
NITRITE UR QL STRIP.AUTO: NEGATIVE
PH UR STRIP.AUTO: 5 UNITS (ref 5–9)
PHOSPHATE SERPL-MCNC: 3.3 MG/DL (ref 2.5–4.9)
PLATELET # BLD AUTO: 164 10^3/UL (ref 150–450)
POTASSIUM SERPL-SCNC: 4.5 MEQ/L (ref 3.5–5.1)
PROT UR QL STRIP.AUTO: NEGATIVE MG/DL
RBC # BLD AUTO: 4.25 10^6/UL (ref 4–5.4)
RBC # UR AUTO: 0 /HPF (ref 0–3)
SODIUM SERPL-SCNC: 138 MEQ/L (ref 136–145)
SP GR UR STRIP.AUTO: 1.01 (ref 1–1.03)
SQUAMOUS #/AREA URNS AUTO: 0 /HPF (ref 0–6)
UROBILINOGEN UR QL STRIP.AUTO: 0.2 MG/DL (ref 0–2)
WBC # BLD AUTO: 2.4 10^3/UL (ref 4–10)
WBC #/AREA URNS AUTO: 1 /HPF (ref 0–3)

## 2019-02-28 ENCOUNTER — HOSPITAL ENCOUNTER (OUTPATIENT)
Dept: HOSPITAL 53 - M SMT | Age: 59
End: 2019-02-28
Attending: INTERNAL MEDICINE
Payer: COMMERCIAL

## 2019-02-28 DIAGNOSIS — Z94.0: Primary | ICD-10-CM

## 2019-02-28 LAB
ALBUMIN SERPL BCG-MCNC: 3.8 GM/DL (ref 3.2–5.2)
APPEARANCE UR: CLEAR
BACTERIA UR QL AUTO: NEGATIVE
BASOPHILS # BLD AUTO: 0 10^3/UL (ref 0–0.2)
BASOPHILS NFR BLD AUTO: 1.1 % (ref 0–1)
BILIRUB UR QL STRIP.AUTO: NEGATIVE
BUN SERPL-MCNC: 22 MG/DL (ref 7–18)
CALCIUM SERPL-MCNC: 8.6 MG/DL (ref 8.5–10.1)
CHLORIDE SERPL-SCNC: 103 MEQ/L (ref 98–107)
CO2 SERPL-SCNC: 28 MEQ/L (ref 21–32)
CREAT SERPL-MCNC: 1.27 MG/DL (ref 0.55–1.3)
EOSINOPHIL # BLD AUTO: 0.2 10^3/UL (ref 0–0.5)
EOSINOPHIL NFR BLD AUTO: 8.6 % (ref 0–3)
GFR SERPL CREATININE-BSD FRML MDRD: 46.2 ML/MIN/{1.73_M2} (ref 51–?)
GLUCOSE SERPL-MCNC: 105 MG/DL (ref 70–100)
GLUCOSE UR QL STRIP.AUTO: NEGATIVE MG/DL
HCT VFR BLD AUTO: 36.1 % (ref 36–47)
HGB BLD-MCNC: 11.6 G/DL (ref 12–15.5)
HGB UR QL STRIP.AUTO: NEGATIVE
KETONES UR QL STRIP.AUTO: NEGATIVE MG/DL
LEUKOCYTE ESTERASE UR QL STRIP.AUTO: NEGATIVE
LYMPHOCYTES # BLD AUTO: 1 10^3/UL (ref 1.5–4.5)
LYMPHOCYTES NFR BLD AUTO: 39 % (ref 24–44)
MCH RBC QN AUTO: 27.4 PG (ref 27–33)
MCHC RBC AUTO-ENTMCNC: 32.1 G/DL (ref 32–36.5)
MCV RBC AUTO: 85.1 FL (ref 80–96)
MONOCYTES # BLD AUTO: 0.2 10^3/UL (ref 0–0.8)
MONOCYTES NFR BLD AUTO: 8.6 % (ref 0–5)
NEUTROPHILS # BLD AUTO: 1.1 10^3/UL (ref 1.8–7.7)
NEUTROPHILS NFR BLD AUTO: 42.7 % (ref 36–66)
NITRITE UR QL STRIP.AUTO: NEGATIVE
PH UR STRIP.AUTO: 6 UNITS (ref 5–9)
PHOSPHATE SERPL-MCNC: 3.6 MG/DL (ref 2.5–4.9)
PLATELET # BLD AUTO: 180 10^3/UL (ref 150–450)
POTASSIUM SERPL-SCNC: 4.4 MEQ/L (ref 3.5–5.1)
PROT UR QL STRIP.AUTO: NEGATIVE MG/DL
RBC # BLD AUTO: 4.24 10^6/UL (ref 4–5.4)
RBC # UR AUTO: 0 /HPF (ref 0–3)
SODIUM SERPL-SCNC: 139 MEQ/L (ref 136–145)
SP GR UR STRIP.AUTO: 1 (ref 1–1.03)
SQUAMOUS #/AREA URNS AUTO: 0 /HPF (ref 0–6)
UROBILINOGEN UR QL STRIP.AUTO: 0.2 MG/DL (ref 0–2)
WBC # BLD AUTO: 2.7 10^3/UL (ref 4–10)
WBC #/AREA URNS AUTO: 0 /HPF (ref 0–3)

## 2019-03-29 ENCOUNTER — HOSPITAL ENCOUNTER (OUTPATIENT)
Dept: HOSPITAL 53 - M SMT | Age: 59
End: 2019-03-29
Attending: INTERNAL MEDICINE
Payer: COMMERCIAL

## 2019-03-29 DIAGNOSIS — Z94.0: Primary | ICD-10-CM

## 2019-03-29 LAB
ALBUMIN SERPL BCG-MCNC: 3.7 GM/DL (ref 3.2–5.2)
APPEARANCE UR: CLEAR
BACTERIA UR QL AUTO: NEGATIVE
BASOPHILS # BLD AUTO: 0 10^3/UL (ref 0–0.2)
BASOPHILS NFR BLD AUTO: 0.9 % (ref 0–1)
BILIRUB UR QL STRIP.AUTO: NEGATIVE
BUN SERPL-MCNC: 28 MG/DL (ref 7–18)
CALCIUM SERPL-MCNC: 8.8 MG/DL (ref 8.5–10.1)
CHLORIDE SERPL-SCNC: 103 MEQ/L (ref 98–107)
CO2 SERPL-SCNC: 27 MEQ/L (ref 21–32)
CREAT SERPL-MCNC: 1.31 MG/DL (ref 0.55–1.3)
EOSINOPHIL # BLD AUTO: 0.1 10^3/UL (ref 0–0.5)
EOSINOPHIL NFR BLD AUTO: 3.1 % (ref 0–3)
GFR SERPL CREATININE-BSD FRML MDRD: 44.6 ML/MIN/{1.73_M2} (ref 51–?)
GLUCOSE SERPL-MCNC: 121 MG/DL (ref 70–100)
GLUCOSE UR QL STRIP.AUTO: NEGATIVE MG/DL
HCT VFR BLD AUTO: 35.5 % (ref 36–47)
HGB BLD-MCNC: 11.5 G/DL (ref 12–15.5)
HGB UR QL STRIP.AUTO: NEGATIVE
KETONES UR QL STRIP.AUTO: NEGATIVE MG/DL
LEUKOCYTE ESTERASE UR QL STRIP.AUTO: NEGATIVE
LYMPHOCYTES # BLD AUTO: 1.2 10^3/UL (ref 1.5–4.5)
LYMPHOCYTES NFR BLD AUTO: 37.6 % (ref 24–44)
MCH RBC QN AUTO: 26.9 PG (ref 27–33)
MCHC RBC AUTO-ENTMCNC: 32.4 G/DL (ref 32–36.5)
MCV RBC AUTO: 83.1 FL (ref 80–96)
MONOCYTES # BLD AUTO: 0.3 10^3/UL (ref 0–0.8)
MONOCYTES NFR BLD AUTO: 8.3 % (ref 0–5)
NEUTROPHILS # BLD AUTO: 1.6 10^3/UL (ref 1.8–7.7)
NEUTROPHILS NFR BLD AUTO: 49.8 % (ref 36–66)
NITRITE UR QL STRIP.AUTO: NEGATIVE
PH UR STRIP.AUTO: 5 UNITS (ref 5–9)
PHOSPHATE SERPL-MCNC: 3.9 MG/DL (ref 2.5–4.9)
PLATELET # BLD AUTO: 214 10^3/UL (ref 150–450)
POTASSIUM SERPL-SCNC: 4.7 MEQ/L (ref 3.5–5.1)
PROT UR QL STRIP.AUTO: NEGATIVE MG/DL
RBC # BLD AUTO: 4.27 10^6/UL (ref 4–5.4)
RBC # UR AUTO: 0 /HPF (ref 0–3)
SODIUM SERPL-SCNC: 139 MEQ/L (ref 136–145)
SP GR UR STRIP.AUTO: 1.01 (ref 1–1.03)
SQUAMOUS #/AREA URNS AUTO: 0 /HPF (ref 0–6)
UROBILINOGEN UR QL STRIP.AUTO: 0.2 MG/DL (ref 0–2)
WBC # BLD AUTO: 3.3 10^3/UL (ref 4–10)
WBC #/AREA URNS AUTO: 0 /HPF (ref 0–3)

## 2019-09-19 ENCOUNTER — HOSPITAL ENCOUNTER (OUTPATIENT)
Dept: HOSPITAL 53 - M SMT | Age: 59
End: 2019-09-19
Attending: INTERNAL MEDICINE
Payer: COMMERCIAL

## 2019-09-19 DIAGNOSIS — Z94.0: ICD-10-CM

## 2019-09-19 DIAGNOSIS — M25.60: Primary | ICD-10-CM

## 2019-09-19 LAB
ALBUMIN SERPL BCG-MCNC: 3.7 GM/DL (ref 3.2–5.2)
ALT SERPL W P-5'-P-CCNC: 19 U/L (ref 12–78)
APPEARANCE UR: CLEAR
BACTERIA UR QL AUTO: NEGATIVE
BILIRUB CONJ SERPL-MCNC: < 0.1 MG/DL (ref 0–0.2)
BILIRUB SERPL-MCNC: 0.4 MG/DL (ref 0.2–1)
BILIRUB UR QL STRIP.AUTO: NEGATIVE
BUN SERPL-MCNC: 28 MG/DL (ref 7–18)
CALCIUM SERPL-MCNC: 8.6 MG/DL (ref 8.5–10.1)
CHLORIDE SERPL-SCNC: 103 MEQ/L (ref 98–107)
CHOLEST SERPL-MCNC: 272 MG/DL (ref ?–200)
CHOLEST/HDLC SERPL: 4.61 {RATIO} (ref ?–5)
CO2 SERPL-SCNC: 29 MEQ/L (ref 21–32)
CREAT SERPL-MCNC: 1.38 MG/DL (ref 0.55–1.3)
CREAT UR-MCNC: 30.3 MG/DL
ERYTHROCYTE [SEDIMENTATION RATE] IN BLOOD BY WESTERGREN METHOD: 14 MM/HR (ref 0–30)
GFR SERPL CREATININE-BSD FRML MDRD: 42 ML/MIN/{1.73_M2} (ref 51–?)
GLUCOSE SERPL-MCNC: 95 MG/DL (ref 70–100)
GLUCOSE UR QL STRIP.AUTO: NEGATIVE MG/DL
HCT VFR BLD AUTO: 36.3 % (ref 36–47)
HDLC SERPL-MCNC: 59 MG/DL (ref 40–?)
HGB BLD-MCNC: 11.4 G/DL (ref 12–15.5)
HGB UR QL STRIP.AUTO: NEGATIVE
KETONES UR QL STRIP.AUTO: NEGATIVE MG/DL
LDLC SERPL CALC-MCNC: 185 MG/DL (ref ?–100)
LEUKOCYTE ESTERASE UR QL STRIP.AUTO: NEGATIVE
MCH RBC QN AUTO: 27.8 PG (ref 27–33)
MCHC RBC AUTO-ENTMCNC: 31.4 G/DL (ref 32–36.5)
MCV RBC AUTO: 88.5 FL (ref 80–96)
NITRITE UR QL STRIP.AUTO: NEGATIVE
NONHDLC SERPL-MCNC: 213 MG/DL
PH UR STRIP.AUTO: 7 UNITS (ref 5–9)
PLATELET # BLD AUTO: 186 10^3/UL (ref 150–450)
POTASSIUM SERPL-SCNC: 4.6 MEQ/L (ref 3.5–5.1)
PROT SERPL-MCNC: 6.3 GM/DL (ref 6.4–8.2)
PROT UR QL STRIP.AUTO: NEGATIVE MG/DL
PROT UR-MCNC: 5.9 MG/DL (ref 0–12)
RBC # BLD AUTO: 4.1 10^6/UL (ref 4–5.4)
RBC # UR AUTO: 0 /HPF (ref 0–3)
RHEUMATOID FACT SERPL-ACNC: 19.2 IU/ML (ref ?–15)
SODIUM SERPL-SCNC: 137 MEQ/L (ref 136–145)
SP GR UR STRIP.AUTO: 1 (ref 1–1.03)
SQUAMOUS #/AREA URNS AUTO: 0 /HPF (ref 0–6)
TRIGL SERPL-MCNC: 140 MG/DL (ref ?–150)
UROBILINOGEN UR QL STRIP.AUTO: 0.2 MG/DL (ref 0–2)
WBC # BLD AUTO: 2.2 10^3/UL (ref 4–10)
WBC #/AREA URNS AUTO: 0 /HPF (ref 0–3)

## 2019-09-21 LAB — CCP IGG SERPL-ACNC: 8 UNITS (ref 0–19)

## 2020-01-17 ENCOUNTER — HOSPITAL ENCOUNTER (OUTPATIENT)
Dept: HOSPITAL 53 - M PLALAB | Age: 60
End: 2020-01-17
Attending: INTERNAL MEDICINE
Payer: COMMERCIAL

## 2020-01-17 DIAGNOSIS — Z94.0: Primary | ICD-10-CM

## 2020-01-17 DIAGNOSIS — M25.60: ICD-10-CM

## 2020-01-17 LAB
ALBUMIN SERPL BCG-MCNC: 4 GM/DL (ref 3.2–5.2)
ALT SERPL W P-5'-P-CCNC: 19 U/L (ref 12–78)
APPEARANCE UR: (no result)
BACTERIA UR QL AUTO: NEGATIVE
BILIRUB CONJ SERPL-MCNC: 0.1 MG/DL (ref 0–0.2)
BILIRUB SERPL-MCNC: 0.5 MG/DL (ref 0.2–1)
BILIRUB UR QL STRIP.AUTO: NEGATIVE
BUN SERPL-MCNC: 24 MG/DL (ref 7–18)
CALCIUM SERPL-MCNC: 8.6 MG/DL (ref 8.5–10.1)
CHLORIDE SERPL-SCNC: 106 MEQ/L (ref 98–107)
CHOLEST SERPL-MCNC: 341 MG/DL (ref ?–200)
CHOLEST/HDLC SERPL: 5.09 {RATIO} (ref ?–5)
CO2 SERPL-SCNC: 26 MEQ/L (ref 21–32)
CREAT SERPL-MCNC: 1.41 MG/DL (ref 0.55–1.3)
CREAT UR-MCNC: 186 MG/DL
GFR SERPL CREATININE-BSD FRML MDRD: 40.8 ML/MIN/{1.73_M2} (ref 51–?)
GLUCOSE SERPL-MCNC: 104 MG/DL (ref 70–100)
GLUCOSE UR QL STRIP.AUTO: NEGATIVE MG/DL
HCT VFR BLD AUTO: 38.6 % (ref 36–47)
HDLC SERPL-MCNC: 67 MG/DL (ref 40–?)
HGB BLD-MCNC: 11.9 G/DL (ref 12–15.5)
HGB UR QL STRIP.AUTO: NEGATIVE
KETONES UR QL STRIP.AUTO: (no result) MG/DL
LDLC SERPL CALC-MCNC: 246 MG/DL (ref ?–100)
LEUKOCYTE ESTERASE UR QL STRIP.AUTO: NEGATIVE
MCH RBC QN AUTO: 26.4 PG (ref 27–33)
MCHC RBC AUTO-ENTMCNC: 30.8 G/DL (ref 32–36.5)
MCV RBC AUTO: 85.8 FL (ref 80–96)
NITRITE UR QL STRIP.AUTO: NEGATIVE
NONHDLC SERPL-MCNC: 274 MG/DL
PH UR STRIP.AUTO: 5 UNITS (ref 5–9)
PLATELET # BLD AUTO: 198 10^3/UL (ref 150–450)
POTASSIUM SERPL-SCNC: 4.5 MEQ/L (ref 3.5–5.1)
PROT SERPL-MCNC: 6.9 GM/DL (ref 6.4–8.2)
PROT UR QL STRIP.AUTO: NEGATIVE MG/DL
PROT UR-MCNC: 18.7 MG/DL (ref 0–12)
RBC # BLD AUTO: 4.5 10^6/UL (ref 4–5.4)
RBC # UR AUTO: 0 /HPF (ref 0–3)
SODIUM SERPL-SCNC: 139 MEQ/L (ref 136–145)
SP GR UR STRIP.AUTO: 1.02 (ref 1–1.03)
SQUAMOUS #/AREA URNS AUTO: 1 /HPF (ref 0–6)
TRIGL SERPL-MCNC: 138 MG/DL (ref ?–150)
UROBILINOGEN UR QL STRIP.AUTO: 0.2 MG/DL (ref 0–2)
WBC # BLD AUTO: 2.9 10^3/UL (ref 4–10)
WBC #/AREA URNS AUTO: 1 /HPF (ref 0–3)

## 2020-04-16 ENCOUNTER — HOSPITAL ENCOUNTER (OUTPATIENT)
Dept: HOSPITAL 53 - M PLALAB | Age: 60
End: 2020-04-16
Attending: FAMILY MEDICINE
Payer: COMMERCIAL

## 2020-04-16 ENCOUNTER — HOSPITAL ENCOUNTER (OUTPATIENT)
Dept: HOSPITAL 53 - M SFHCADAM | Age: 60
End: 2020-04-16
Attending: FAMILY MEDICINE
Payer: COMMERCIAL

## 2020-04-16 DIAGNOSIS — N18.3: Primary | ICD-10-CM

## 2020-04-16 DIAGNOSIS — E78.5: ICD-10-CM

## 2020-04-16 DIAGNOSIS — Z94.0: ICD-10-CM

## 2020-04-16 DIAGNOSIS — R23.8: ICD-10-CM

## 2020-04-16 DIAGNOSIS — Z94.0: Primary | ICD-10-CM

## 2020-04-16 DIAGNOSIS — N18.3: ICD-10-CM

## 2020-04-16 LAB
ALBUMIN SERPL BCG-MCNC: 3.6 GM/DL (ref 3.2–5.2)
ALT SERPL W P-5'-P-CCNC: 21 U/L (ref 12–78)
APPEARANCE UR: CLEAR
APTT BLD: 31.8 SECONDS (ref 25–38.4)
BACTERIA UR QL AUTO: NEGATIVE
BASOPHILS # BLD AUTO: 0 10^3/UL (ref 0–0.2)
BASOPHILS NFR BLD AUTO: 1.3 % (ref 0–1)
BILIRUB SERPL-MCNC: 0.4 MG/DL (ref 0.2–1)
BILIRUB UR QL STRIP.AUTO: NEGATIVE
BUN SERPL-MCNC: 27 MG/DL (ref 7–18)
CALCIUM SERPL-MCNC: 8.7 MG/DL (ref 8.5–10.1)
CHLORIDE SERPL-SCNC: 101 MEQ/L (ref 98–107)
CHOLEST SERPL-MCNC: 276 MG/DL (ref ?–200)
CHOLEST/HDLC SERPL: 4.45 {RATIO} (ref ?–5)
CO2 SERPL-SCNC: 29 MEQ/L (ref 21–32)
CREAT SERPL-MCNC: 1.21 MG/DL (ref 0.55–1.3)
EOSINOPHIL # BLD AUTO: 0.1 10^3/UL (ref 0–0.5)
EOSINOPHIL NFR BLD AUTO: 3.3 % (ref 0–3)
GFR SERPL CREATININE-BSD FRML MDRD: 48.7 ML/MIN/{1.73_M2} (ref 51–?)
GLUCOSE SERPL-MCNC: 96 MG/DL (ref 70–100)
GLUCOSE UR QL STRIP.AUTO: NEGATIVE MG/DL
HCT VFR BLD AUTO: 37.1 % (ref 36–47)
HDLC SERPL-MCNC: 62 MG/DL (ref 40–?)
HGB BLD-MCNC: 11.7 G/DL (ref 12–15.5)
HGB UR QL STRIP.AUTO: NEGATIVE
INR PPP: 0.97
KETONES UR QL STRIP.AUTO: NEGATIVE MG/DL
LDLC SERPL CALC-MCNC: 184 MG/DL (ref ?–100)
LEUKOCYTE ESTERASE UR QL STRIP.AUTO: NEGATIVE
LYMPHOCYTES # BLD AUTO: 1 10^3/UL (ref 1.5–5)
LYMPHOCYTES NFR BLD AUTO: 40 % (ref 24–44)
MCH RBC QN AUTO: 27.2 PG (ref 27–33)
MCHC RBC AUTO-ENTMCNC: 31.5 G/DL (ref 32–36.5)
MCV RBC AUTO: 86.3 FL (ref 80–96)
MONOCYTES # BLD AUTO: 0.2 10^3/UL (ref 0–0.8)
MONOCYTES NFR BLD AUTO: 8.8 % (ref 0–5)
NEUTROPHILS # BLD AUTO: 1.1 10^3/UL (ref 1.5–8.5)
NEUTROPHILS NFR BLD AUTO: 46.6 % (ref 36–66)
NITRITE UR QL STRIP.AUTO: NEGATIVE
NONHDLC SERPL-MCNC: 214 MG/DL
PH UR STRIP.AUTO: 7 UNITS (ref 5–9)
PLATELET # BLD AUTO: 170 10^3/UL (ref 150–450)
POTASSIUM SERPL-SCNC: 4.8 MEQ/L (ref 3.5–5.1)
PROT SERPL-MCNC: 6.6 GM/DL (ref 6.4–8.2)
PROT UR QL STRIP.AUTO: NEGATIVE MG/DL
PROTHROMBIN TIME: 12.6 SECONDS (ref 11.8–14)
RBC # BLD AUTO: 4.3 10^6/UL (ref 4–5.4)
RBC # UR AUTO: 0 /HPF (ref 0–3)
SODIUM SERPL-SCNC: 135 MEQ/L (ref 136–145)
SP GR UR STRIP.AUTO: 1 (ref 1–1.03)
SQUAMOUS #/AREA URNS AUTO: 0 /HPF (ref 0–6)
T4 FREE SERPL-MCNC: 0.97 NG/DL (ref 0.76–1.46)
TRIGL SERPL-MCNC: 152 MG/DL (ref ?–150)
TSH SERPL DL<=0.005 MIU/L-ACNC: 2.2 UIU/ML (ref 0.36–3.74)
UROBILINOGEN UR QL STRIP.AUTO: 0.2 MG/DL (ref 0–2)
WBC # BLD AUTO: 2.4 10^3/UL (ref 4–10)
WBC #/AREA URNS AUTO: 0 /HPF (ref 0–3)

## 2020-05-28 ENCOUNTER — HOSPITAL ENCOUNTER (OUTPATIENT)
Dept: HOSPITAL 53 - M SFHCADAM | Age: 60
End: 2020-05-28
Attending: FAMILY MEDICINE
Payer: COMMERCIAL

## 2020-05-28 DIAGNOSIS — R39.15: Primary | ICD-10-CM

## 2020-05-28 LAB
APPEARANCE UR: CLEAR
BACTERIA UR QL AUTO: NEGATIVE
BILIRUB UR QL STRIP.AUTO: NEGATIVE
GLUCOSE UR QL STRIP.AUTO: NEGATIVE MG/DL
HGB UR QL STRIP.AUTO: NEGATIVE
KETONES UR QL STRIP.AUTO: NEGATIVE MG/DL
LEUKOCYTE ESTERASE UR QL STRIP.AUTO: NEGATIVE
NITRITE UR QL STRIP.AUTO: NEGATIVE
PH UR STRIP.AUTO: 5 UNITS (ref 5–9)
PROT UR QL STRIP.AUTO: NEGATIVE MG/DL
RBC # UR AUTO: 0 /HPF (ref 0–3)
SP GR UR STRIP.AUTO: 1 (ref 1–1.03)
SQUAMOUS #/AREA URNS AUTO: 0 /HPF (ref 0–6)
UROBILINOGEN UR QL STRIP.AUTO: 0.2 MG/DL (ref 0–2)
WBC #/AREA URNS AUTO: 0 /HPF (ref 0–3)

## 2020-06-25 ENCOUNTER — HOSPITAL ENCOUNTER (OUTPATIENT)
Dept: HOSPITAL 53 - M SFHCADAM | Age: 60
End: 2020-06-25
Attending: FAMILY MEDICINE
Payer: COMMERCIAL

## 2020-06-25 DIAGNOSIS — Z94.0: ICD-10-CM

## 2020-06-25 DIAGNOSIS — E78.5: ICD-10-CM

## 2020-06-25 DIAGNOSIS — R53.82: Primary | ICD-10-CM

## 2020-06-25 DIAGNOSIS — R53.83: ICD-10-CM

## 2020-06-25 DIAGNOSIS — E16.2: ICD-10-CM

## 2020-06-25 DIAGNOSIS — R39.15: ICD-10-CM

## 2020-06-25 LAB
ALBUMIN SERPL BCG-MCNC: 3.7 GM/DL (ref 3.2–5.2)
ALT SERPL W P-5'-P-CCNC: 21 U/L (ref 12–78)
BILIRUB SERPL-MCNC: 0.4 MG/DL (ref 0.2–1)
BUN SERPL-MCNC: 28 MG/DL (ref 7–18)
CALCIUM SERPL-MCNC: 9.1 MG/DL (ref 8.5–10.1)
CHLORIDE SERPL-SCNC: 105 MEQ/L (ref 98–107)
CHOLEST SERPL-MCNC: 262 MG/DL (ref ?–200)
CHOLEST/HDLC SERPL: 4.03 {RATIO} (ref ?–5)
CO2 SERPL-SCNC: 30 MEQ/L (ref 21–32)
CORTIS SERPL-MCNC: 8.5 UG/DL (ref 4.3–22.4)
CREAT SERPL-MCNC: 1.3 MG/DL (ref 0.55–1.3)
EST. AVERAGE GLUCOSE BLD GHB EST-MCNC: 114 MG/DL (ref 60–110)
GFR SERPL CREATININE-BSD FRML MDRD: 44.8 ML/MIN/{1.73_M2} (ref 51–?)
GLUCOSE SERPL-MCNC: 92 MG/DL (ref 70–100)
HCT VFR BLD AUTO: 38.5 % (ref 36–47)
HDLC SERPL-MCNC: 65 MG/DL (ref 40–?)
HGB BLD-MCNC: 12.1 G/DL (ref 12–15.5)
LDLC SERPL CALC-MCNC: 167 MG/DL (ref ?–100)
MCH RBC QN AUTO: 27.4 PG (ref 27–33)
MCHC RBC AUTO-ENTMCNC: 31.4 G/DL (ref 32–36.5)
MCV RBC AUTO: 87.1 FL (ref 80–96)
NONHDLC SERPL-MCNC: 197 MG/DL
PLATELET # BLD AUTO: 192 10^3/UL (ref 150–450)
POTASSIUM SERPL-SCNC: 4.9 MEQ/L (ref 3.5–5.1)
PROT SERPL-MCNC: 6.8 GM/DL (ref 6.4–8.2)
RBC # BLD AUTO: 4.42 10^6/UL (ref 4–5.4)
SODIUM SERPL-SCNC: 140 MEQ/L (ref 136–145)
T4 FREE SERPL-MCNC: 1.07 NG/DL (ref 0.76–1.46)
TRIGL SERPL-MCNC: 152 MG/DL (ref ?–150)
TSH SERPL DL<=0.005 MIU/L-ACNC: 1.92 UIU/ML (ref 0.36–3.74)
WBC # BLD AUTO: 3.1 10^3/UL (ref 4–10)

## 2020-07-09 ENCOUNTER — HOSPITAL ENCOUNTER (OUTPATIENT)
Dept: HOSPITAL 53 - M SFHCWAGY | Age: 60
End: 2020-07-09
Attending: NURSE PRACTITIONER
Payer: COMMERCIAL

## 2020-07-09 DIAGNOSIS — N85.8: ICD-10-CM

## 2020-07-09 DIAGNOSIS — Z12.4: Primary | ICD-10-CM

## 2020-07-09 PROCEDURE — 87624 HPV HI-RISK TYP POOLED RSLT: CPT

## 2020-09-28 ENCOUNTER — HOSPITAL ENCOUNTER (OUTPATIENT)
Dept: HOSPITAL 53 - M PLALAB | Age: 60
End: 2020-09-28
Attending: INTERNAL MEDICINE
Payer: COMMERCIAL

## 2020-09-28 DIAGNOSIS — E78.2: ICD-10-CM

## 2020-09-28 DIAGNOSIS — Z94.0: Primary | ICD-10-CM

## 2020-09-28 LAB
ALBUMIN SERPL BCG-MCNC: 3.7 GM/DL (ref 3.2–5.2)
ALT SERPL W P-5'-P-CCNC: 23 U/L (ref 12–78)
BILIRUB SERPL-MCNC: 0.5 MG/DL (ref 0.2–1)
BUN SERPL-MCNC: 25 MG/DL (ref 7–18)
CALCIUM SERPL-MCNC: 8.8 MG/DL (ref 8.5–10.1)
CHLORIDE SERPL-SCNC: 102 MEQ/L (ref 98–107)
CO2 SERPL-SCNC: 28 MEQ/L (ref 21–32)
CREAT SERPL-MCNC: 1.21 MG/DL (ref 0.55–1.3)
GFR SERPL CREATININE-BSD FRML MDRD: 48.7 ML/MIN/{1.73_M2} (ref 51–?)
GLUCOSE SERPL-MCNC: 111 MG/DL (ref 70–100)
HCT VFR BLD AUTO: 38.3 % (ref 36–47)
HGB BLD-MCNC: 12 G/DL (ref 12–15.5)
MCH RBC QN AUTO: 26.8 PG (ref 27–33)
MCHC RBC AUTO-ENTMCNC: 31.3 G/DL (ref 32–36.5)
MCV RBC AUTO: 85.5 FL (ref 80–96)
PLATELET # BLD AUTO: 193 10^3/UL (ref 150–450)
POTASSIUM SERPL-SCNC: 4.1 MEQ/L (ref 3.5–5.1)
PROT SERPL-MCNC: 6.8 GM/DL (ref 6.4–8.2)
RBC # BLD AUTO: 4.48 10^6/UL (ref 4–5.4)
SODIUM SERPL-SCNC: 135 MEQ/L (ref 136–145)
WBC # BLD AUTO: 2.8 10^3/UL (ref 4–10)

## 2020-10-08 ENCOUNTER — HOSPITAL ENCOUNTER (OUTPATIENT)
Dept: HOSPITAL 53 - M ADAMS | Age: 60
End: 2020-10-08
Attending: FAMILY MEDICINE
Payer: COMMERCIAL

## 2020-10-08 ENCOUNTER — HOSPITAL ENCOUNTER (OUTPATIENT)
Dept: HOSPITAL 53 - M SFHCADAM | Age: 60
End: 2020-10-08
Attending: FAMILY MEDICINE
Payer: COMMERCIAL

## 2020-10-08 DIAGNOSIS — R05: Primary | ICD-10-CM

## 2020-10-08 DIAGNOSIS — N18.3: ICD-10-CM

## 2020-10-08 DIAGNOSIS — Z94.0: Primary | ICD-10-CM

## 2020-10-08 LAB
APPEARANCE UR: CLEAR
BACTERIA UR QL AUTO: NEGATIVE
BILIRUB UR QL STRIP.AUTO: NEGATIVE
GLUCOSE UR QL STRIP.AUTO: NEGATIVE MG/DL
HGB UR QL STRIP.AUTO: NEGATIVE
KETONES UR QL STRIP.AUTO: NEGATIVE MG/DL
LEUKOCYTE ESTERASE UR QL STRIP.AUTO: NEGATIVE
NITRITE UR QL STRIP.AUTO: NEGATIVE
PH UR STRIP.AUTO: 7 UNITS (ref 5–9)
PROT UR QL STRIP.AUTO: NEGATIVE MG/DL
RBC # UR AUTO: 0 /HPF (ref 0–3)
SP GR UR STRIP.AUTO: 1.01 (ref 1–1.03)
SQUAMOUS #/AREA URNS AUTO: 0 /HPF (ref 0–6)
UROBILINOGEN UR QL STRIP.AUTO: 0.2 MG/DL (ref 0–2)
WBC #/AREA URNS AUTO: 0 /HPF (ref 0–3)

## 2020-10-13 NOTE — REP
CHEST X-RAY: 2-VIEWS 



HISTORY: Chronic cough. 



COMPARISON: Chest x-ray 11/03/2014. 



FINDINGS: 

The lungs are symmetrically aerated and clear. The pleural angles are sharp. 
Heart size is normal. Pulmonary vasculature is not increased. No significant 
bony abnormality is seen.   



IMPRESSION: 

Negative chest x-ray.   

MTDD

## 2020-10-29 ENCOUNTER — HOSPITAL ENCOUNTER (OUTPATIENT)
Dept: HOSPITAL 53 - M PLALAB | Age: 60
End: 2020-10-29
Attending: INTERNAL MEDICINE
Payer: COMMERCIAL

## 2020-10-29 DIAGNOSIS — E78.2: ICD-10-CM

## 2020-10-29 DIAGNOSIS — R73.9: Primary | ICD-10-CM

## 2020-10-29 DIAGNOSIS — Z94.0: ICD-10-CM

## 2020-10-29 LAB
ALBUMIN SERPL BCG-MCNC: 3.8 GM/DL (ref 3.2–5.2)
ALT SERPL W P-5'-P-CCNC: 19 U/L (ref 12–78)
ALT SERPL W P-5'-P-CCNC: 20 U/L (ref 12–78)
APPEARANCE UR: CLEAR
BACTERIA UR QL AUTO: NEGATIVE
BASOPHILS # BLD AUTO: 0 10^3/UL (ref 0–0.2)
BASOPHILS NFR BLD AUTO: 0.9 % (ref 0–1)
BILIRUB SERPL-MCNC: 0.5 MG/DL (ref 0.2–1)
BILIRUB SERPL-MCNC: 0.5 MG/DL (ref 0.2–1)
BILIRUB UR QL STRIP.AUTO: NEGATIVE
BUN SERPL-MCNC: 20 MG/DL (ref 7–18)
BUN SERPL-MCNC: 20 MG/DL (ref 7–18)
CALCIUM SERPL-MCNC: 9 MG/DL (ref 8.5–10.1)
CALCIUM SERPL-MCNC: 9 MG/DL (ref 8.5–10.1)
CHLORIDE SERPL-SCNC: 104 MEQ/L (ref 98–107)
CHLORIDE SERPL-SCNC: 106 MEQ/L (ref 98–107)
CHOLEST SERPL-MCNC: 271 MG/DL (ref ?–200)
CHOLEST/HDLC SERPL: 4.04 {RATIO} (ref ?–5)
CO2 SERPL-SCNC: 30 MEQ/L (ref 21–32)
CO2 SERPL-SCNC: 31 MEQ/L (ref 21–32)
CREAT SERPL-MCNC: 1.23 MG/DL (ref 0.55–1.3)
CREAT SERPL-MCNC: 1.28 MG/DL (ref 0.55–1.3)
CREAT UR-MCNC: 23.8 MG/DL
EOSINOPHIL # BLD AUTO: 0.1 10^3/UL (ref 0–0.5)
EOSINOPHIL NFR BLD AUTO: 5 % (ref 0–3)
EST. AVERAGE GLUCOSE BLD GHB EST-MCNC: 111 MG/DL (ref 60–110)
GFR SERPL CREATININE-BSD FRML MDRD: 45.6 ML/MIN/{1.73_M2} (ref 51–?)
GFR SERPL CREATININE-BSD FRML MDRD: 47.7 ML/MIN/{1.73_M2} (ref 51–?)
GLUCOSE SERPL-MCNC: 100 MG/DL (ref 70–100)
GLUCOSE SERPL-MCNC: 98 MG/DL (ref 70–100)
GLUCOSE UR QL STRIP.AUTO: NEGATIVE MG/DL
HCT VFR BLD AUTO: 38.1 % (ref 36–47)
HCT VFR BLD AUTO: 38.5 % (ref 36–47)
HDLC SERPL-MCNC: 67 MG/DL (ref 40–?)
HGB BLD-MCNC: 11.9 G/DL (ref 12–15.5)
HGB BLD-MCNC: 11.9 G/DL (ref 12–15.5)
HGB UR QL STRIP.AUTO: NEGATIVE
KETONES UR QL STRIP.AUTO: NEGATIVE MG/DL
LDH SERPL L TO P-CCNC: 142 U/L (ref 84–246)
LDLC SERPL CALC-MCNC: 175 MG/DL (ref ?–100)
LEUKOCYTE ESTERASE UR QL STRIP.AUTO: NEGATIVE
LYMPHOCYTES # BLD AUTO: 0.9 10^3/UL (ref 1.5–5)
LYMPHOCYTES NFR BLD AUTO: 43.1 % (ref 24–44)
MCH RBC QN AUTO: 26.4 PG (ref 27–33)
MCH RBC QN AUTO: 26.7 PG (ref 27–33)
MCHC RBC AUTO-ENTMCNC: 30.9 G/DL (ref 32–36.5)
MCHC RBC AUTO-ENTMCNC: 31.2 G/DL (ref 32–36.5)
MCV RBC AUTO: 85.4 FL (ref 80–96)
MCV RBC AUTO: 85.6 FL (ref 80–96)
MICROALBUMIN UR-MCNC: < 5 MG/L
MICROALBUMIN/CREAT UR: 21 MCG/MG (ref 0–30)
MONOCYTES # BLD AUTO: 0.2 10^3/UL (ref 0–0.8)
MONOCYTES NFR BLD AUTO: 8.7 % (ref 0–5)
NEUTROPHILS # BLD AUTO: 0.9 10^3/UL (ref 1.5–8.5)
NEUTROPHILS NFR BLD AUTO: 42.3 % (ref 36–66)
NITRITE UR QL STRIP.AUTO: NEGATIVE
NONHDLC SERPL-MCNC: 204 MG/DL
PH UR STRIP.AUTO: 6 UNITS (ref 5–9)
PLATELET # BLD AUTO: 175 10^3/UL (ref 150–450)
PLATELET # BLD AUTO: 180 10^3/UL (ref 150–450)
POTASSIUM SERPL-SCNC: 4.6 MEQ/L (ref 3.5–5.1)
POTASSIUM SERPL-SCNC: 4.8 MEQ/L (ref 3.5–5.1)
PROT SERPL-MCNC: 6.8 GM/DL (ref 6.4–8.2)
PROT UR QL STRIP.AUTO: NEGATIVE MG/DL
RBC # BLD AUTO: 4.45 10^6/UL (ref 4–5.4)
RBC # BLD AUTO: 4.51 10^6/UL (ref 4–5.4)
RBC # UR AUTO: 0 /HPF (ref 0–3)
SODIUM SERPL-SCNC: 139 MEQ/L (ref 136–145)
SODIUM SERPL-SCNC: 140 MEQ/L (ref 136–145)
SP GR UR STRIP.AUTO: 1 (ref 1–1.03)
SQUAMOUS #/AREA URNS AUTO: 0 /HPF (ref 0–6)
TRIGL SERPL-MCNC: 144 MG/DL (ref ?–150)
UROBILINOGEN UR QL STRIP.AUTO: 0.2 MG/DL (ref 0–2)
WBC # BLD AUTO: 2.2 10^3/UL (ref 4–10)
WBC # BLD AUTO: 2.3 10^3/UL (ref 4–10)
WBC #/AREA URNS AUTO: 0 /HPF (ref 0–3)

## 2021-01-28 ENCOUNTER — HOSPITAL ENCOUNTER (OUTPATIENT)
Dept: HOSPITAL 53 - M PLALAB | Age: 61
End: 2021-01-28
Attending: INTERNAL MEDICINE
Payer: COMMERCIAL

## 2021-01-28 DIAGNOSIS — E78.2: ICD-10-CM

## 2021-01-28 DIAGNOSIS — Z94.0: Primary | ICD-10-CM

## 2021-01-28 LAB
ALBUMIN SERPL BCG-MCNC: 3.8 GM/DL (ref 3.2–5.2)
ALT SERPL W P-5'-P-CCNC: 26 U/L (ref 12–78)
BILIRUB SERPL-MCNC: 0.4 MG/DL (ref 0.2–1)
BUN SERPL-MCNC: 24 MG/DL (ref 7–18)
CALCIUM SERPL-MCNC: 9 MG/DL (ref 8.5–10.1)
CHLORIDE SERPL-SCNC: 104 MEQ/L (ref 98–107)
CO2 SERPL-SCNC: 30 MEQ/L (ref 21–32)
CREAT SERPL-MCNC: 1.48 MG/DL (ref 0.55–1.3)
GFR SERPL CREATININE-BSD FRML MDRD: 38.4 ML/MIN/{1.73_M2} (ref 51–?)
GLUCOSE SERPL-MCNC: 86 MG/DL (ref 70–100)
HCT VFR BLD AUTO: 36.4 % (ref 36–47)
HGB BLD-MCNC: 11.5 G/DL (ref 12–15.5)
MCH RBC QN AUTO: 27.1 PG (ref 27–33)
MCHC RBC AUTO-ENTMCNC: 31.6 G/DL (ref 32–36.5)
MCV RBC AUTO: 85.6 FL (ref 80–96)
PLATELET # BLD AUTO: 173 10^3/UL (ref 150–450)
POTASSIUM SERPL-SCNC: 4.2 MEQ/L (ref 3.5–5.1)
PROT SERPL-MCNC: 6.6 GM/DL (ref 6.4–8.2)
RBC # BLD AUTO: 4.25 10^6/UL (ref 4–5.4)
SODIUM SERPL-SCNC: 140 MEQ/L (ref 136–145)
WBC # BLD AUTO: 2.3 10^3/UL (ref 4–10)

## 2021-07-08 ENCOUNTER — HOSPITAL ENCOUNTER (OUTPATIENT)
Dept: HOSPITAL 53 - M PLALAB | Age: 61
End: 2021-07-08
Attending: FAMILY MEDICINE
Payer: COMMERCIAL

## 2021-07-08 DIAGNOSIS — E78.2: Primary | ICD-10-CM

## 2021-07-08 LAB
ALBUMIN SERPL BCG-MCNC: 3.7 GM/DL (ref 3.2–5.2)
ALT SERPL W P-5'-P-CCNC: 28 U/L (ref 12–78)
BASOPHILS # BLD AUTO: 0 10^3/UL (ref 0–0.2)
BASOPHILS NFR BLD AUTO: 1 % (ref 0–1)
BILIRUB SERPL-MCNC: 0.5 MG/DL (ref 0.2–1)
BUN SERPL-MCNC: 20 MG/DL (ref 7–18)
CALCIUM SERPL-MCNC: 8.7 MG/DL (ref 8.5–10.1)
CHLORIDE SERPL-SCNC: 103 MEQ/L (ref 98–107)
CHOLEST SERPL-MCNC: 253 MG/DL (ref ?–200)
CHOLEST/HDLC SERPL: 3.56 {RATIO} (ref ?–5)
CO2 SERPL-SCNC: 28 MEQ/L (ref 21–32)
CREAT SERPL-MCNC: 1.18 MG/DL (ref 0.55–1.3)
EOSINOPHIL # BLD AUTO: 0.1 10^3/UL (ref 0–0.5)
EOSINOPHIL NFR BLD AUTO: 4.6 % (ref 0–3)
GFR SERPL CREATININE-BSD FRML MDRD: 49.9 ML/MIN/{1.73_M2} (ref 51–?)
GLUCOSE SERPL-MCNC: 102 MG/DL (ref 70–100)
HCT VFR BLD AUTO: 38.5 % (ref 36–47)
HDLC SERPL-MCNC: 71 MG/DL (ref 40–?)
HGB BLD-MCNC: 12.1 G/DL (ref 12–15.5)
LDLC SERPL CALC-MCNC: 160 MG/DL (ref ?–100)
LYMPHOCYTES # BLD AUTO: 1.2 10^3/UL (ref 1.5–5)
LYMPHOCYTES NFR BLD AUTO: 37.8 % (ref 24–44)
MCH RBC QN AUTO: 27 PG (ref 27–33)
MCHC RBC AUTO-ENTMCNC: 31.4 G/DL (ref 32–36.5)
MCV RBC AUTO: 85.9 FL (ref 80–96)
MONOCYTES # BLD AUTO: 0.3 10^3/UL (ref 0–0.8)
MONOCYTES NFR BLD AUTO: 9.8 % (ref 2–8)
NEUTROPHILS # BLD AUTO: 1.4 10^3/UL (ref 1.5–8.5)
NEUTROPHILS NFR BLD AUTO: 46.8 % (ref 36–66)
NONHDLC SERPL-MCNC: 182 MG/DL
PLATELET # BLD AUTO: 185 10^3/UL (ref 150–450)
POTASSIUM SERPL-SCNC: 4.4 MEQ/L (ref 3.5–5.1)
PROT SERPL-MCNC: 6.7 GM/DL (ref 6.4–8.2)
RBC # BLD AUTO: 4.48 10^6/UL (ref 4–5.4)
SODIUM SERPL-SCNC: 139 MEQ/L (ref 136–145)
TRIGL SERPL-MCNC: 111 MG/DL (ref ?–150)
WBC # BLD AUTO: 3.1 10^3/UL (ref 4–10)

## 2021-07-29 ENCOUNTER — HOSPITAL ENCOUNTER (OUTPATIENT)
Dept: HOSPITAL 53 - M PLALAB | Age: 61
End: 2021-07-29
Attending: FAMILY MEDICINE
Payer: COMMERCIAL

## 2021-07-29 DIAGNOSIS — Z94.0: Primary | ICD-10-CM

## 2021-09-20 ENCOUNTER — HOSPITAL ENCOUNTER (EMERGENCY)
Dept: HOSPITAL 53 - M ED | Age: 61
LOS: 1 days | Discharge: HOME | End: 2021-09-21
Payer: COMMERCIAL

## 2021-09-20 VITALS — HEIGHT: 68 IN | WEIGHT: 153.44 LBS | BODY MASS INDEX: 23.26 KG/M2

## 2021-09-20 DIAGNOSIS — J12.82: ICD-10-CM

## 2021-09-20 DIAGNOSIS — U07.1: Primary | ICD-10-CM

## 2021-09-20 LAB
ALBUMIN SERPL BCG-MCNC: 3.2 GM/DL (ref 3.2–5.2)
ALT SERPL W P-5'-P-CCNC: 27 U/L (ref 12–78)
BASOPHILS # BLD AUTO: 0 10^3/UL (ref 0–0.2)
BASOPHILS NFR BLD AUTO: 0 % (ref 0–1)
BILIRUB SERPL-MCNC: 0.3 MG/DL (ref 0.2–1)
BUN SERPL-MCNC: 19 MG/DL (ref 7–18)
CALCIUM SERPL-MCNC: 8.4 MG/DL (ref 8.5–10.1)
CHLORIDE SERPL-SCNC: 103 MEQ/L (ref 98–107)
CK MB CFR.DF SERPL CALC: 1.14
CK MB SERPL-MCNC: < 1 NG/ML (ref ?–3.6)
CK SERPL-CCNC: 88 U/L (ref 26–192)
CO2 SERPL-SCNC: 28 MEQ/L (ref 21–32)
CREAT SERPL-MCNC: 1.23 MG/DL (ref 0.55–1.3)
EOSINOPHIL # BLD AUTO: 0 10^3/UL (ref 0–0.5)
EOSINOPHIL NFR BLD AUTO: 0 % (ref 0–3)
GFR SERPL CREATININE-BSD FRML MDRD: 47.6 ML/MIN/{1.73_M2} (ref 51–?)
GLUCOSE SERPL-MCNC: 103 MG/DL (ref 70–100)
HCT VFR BLD AUTO: 37.6 % (ref 36–47)
HGB BLD-MCNC: 12.2 G/DL (ref 12–15.5)
LYMPHOCYTES # BLD AUTO: 0.8 10^3/UL (ref 1.5–5)
LYMPHOCYTES NFR BLD AUTO: 41.5 % (ref 24–44)
MCH RBC QN AUTO: 26.6 PG (ref 27–33)
MCHC RBC AUTO-ENTMCNC: 32.4 G/DL (ref 32–36.5)
MCV RBC AUTO: 82.1 FL (ref 80–96)
MONOCYTES # BLD AUTO: 0.2 10^3/UL (ref 0–0.8)
MONOCYTES NFR BLD AUTO: 8.7 % (ref 2–8)
NEUTROPHILS # BLD AUTO: 1 10^3/UL (ref 1.5–8.5)
NEUTROPHILS NFR BLD AUTO: 49.8 % (ref 36–66)
PLATELET # BLD AUTO: 134 10^3/UL (ref 150–450)
POTASSIUM SERPL-SCNC: 4.1 MEQ/L (ref 3.5–5.1)
PROT SERPL-MCNC: 6.4 GM/DL (ref 6.4–8.2)
RBC # BLD AUTO: 4.58 10^6/UL (ref 4–5.4)
RSV RNA NPH QL NAA+PROBE: NEGATIVE
SODIUM SERPL-SCNC: 136 MEQ/L (ref 136–145)
TROPONIN I SERPL-MCNC: < 0.02 NG/ML (ref ?–0.1)
WBC # BLD AUTO: 2 10^3/UL (ref 4–10)

## 2021-09-20 PROCEDURE — 82553 CREATINE MB FRACTION: CPT

## 2021-09-20 PROCEDURE — 87631 RESP VIRUS 3-5 TARGETS: CPT

## 2021-09-20 PROCEDURE — 80053 COMPREHEN METABOLIC PANEL: CPT

## 2021-09-20 PROCEDURE — 82550 ASSAY OF CK (CPK): CPT

## 2021-09-20 PROCEDURE — 85025 COMPLETE CBC W/AUTO DIFF WBC: CPT

## 2021-09-20 PROCEDURE — 99284 EMERGENCY DEPT VISIT MOD MDM: CPT

## 2021-09-20 PROCEDURE — 84484 ASSAY OF TROPONIN QUANT: CPT

## 2021-09-20 PROCEDURE — 93005 ELECTROCARDIOGRAM TRACING: CPT

## 2021-09-20 PROCEDURE — 71045 X-RAY EXAM CHEST 1 VIEW: CPT

## 2021-09-20 PROCEDURE — 96374 THER/PROPH/DIAG INJ IV PUSH: CPT

## 2021-09-20 NOTE — REP
INDICATION:

COVID pneumonia.



COMPARISON:

Comparison chest x-ray is from October 8, 2020.



TECHNIQUE:

Upright sitting AP chest x-ray.  Portable exam.



FINDINGS:

The lungs are well inflated.  There is subtle area of increased density above the

minor fissure in the right upper lobe.  Lung fields are otherwise clear.  Pleural

angles are sharp.  Heart size is normal.  Pulmonary vasculature is not increased.  No

bony abnormality.



IMPRESSION:

Subtle right upper lobe infiltrate suspected just above the minor fissure.  Otherwise

negative portable chest x-ray.





<Electronically signed by Marcelo Jones > 09/20/21 [id number]

## 2021-09-20 NOTE — HPEPDOC
[facility] Medical History & Physical


Date of Admission


Sep 20, 2021


Date of Service:  Sep 20, 2021


Primary Care Physician:  Arias Gutierres MD


Attending Physician:  GOLD HENDRIX MD





History and Physical


CHIEF COMPLAINT: [malaise]





HISTORY OF PRESENT ILLNESS: [This is a 60 y/o female with a pmh of polycystic 

kidney disease s/p transplantation on immunomodulating antirejection therapy who

 presents to our ED on 9/20 with a cc of malaise, fever, nausea, vomiting that 

have been escalating over the past week. Patient's symptoms began approximately 

9 days ago and she was tested positive 6 days ago. Patient states that she has 

been taking tylenol as well as "natural therapies" at home to some relief but 

states that overall she is feeling worse and decided to come in to see if she 

could qualify for monoclonal antibody therapy. Patient states she is concerned 

because with her vomiting she has not been able to keep down her antirejection 

medications. Patient states that she has had "heavy breathing" throughout the 

course of her illness but not explicitly sob. Patient, at the time of my exam, 

denies chest pain, syncope, paresthesias, pedal edema, hemoptysis, calf pain.]





PAST MEDICAL HISTORY:


1. [See HPI





PAST SURGICAL HISTORY:


1. [Kidney transplant].





SOCIAL HISTORY:


Tobacco use:[Denies] 


ETOH: [Denies]


Illicit drug use: [Denies]





FAMILY HISTORY:


Reviewed - none pertinent





ALLERGIES: Please see below.





REVIEW OF SYSTEMS:


CONSTITUTIONAL: [See HPI].


HEENT: [See HPI].


CARDIOVASCULAR: [Denies chest pain, palpitations].


RESPIRATORY: [See HPI].


GASTROINTESTINAL: [See HPI].


GENITOURINARY: [Denies dysuria].


SKIN: [Denies rash].


MUSCULOSKELETAL: [Denies acute joint pain].


NEUROLOGICAL: [Denies syncope, paresthesias].


ENDOCRINE: [Denies hx of DM].


HEMATOLOGIC/LYMPHATIC: [Denies hx of vte].





HOME MEDICATIONS: Please see below. 





PHYSICAL EXAMINATION:


VITAL SIGNS: Please see below.


GENERAL APPEARANCE: [This is a fatigued appearing 60 y/o female who is alert and

 oriented to all questioning. She does not appear to be in any respiratory 

distress]. 


HEENT: [No mass or lesion. EOMI. Nares patent. Oral mucosa moist].


CARDIOVASCULAR: [Regular rate, rhythm].


LUNGS: [Good air flow b/l. No wheezing, rales].


ABDOMEN: [Soft, nontender].


MUSCULOSKELETAL: [No joint deformity noted].


EXTREMITIES: [No pedal edema appreciated. Pulses intact. No overlying skin 

changes].


NEUROLOGICAL: [Speech clear. A+Ox3. No focal deficits].


PSYCHIATRIC: [Mood and affect appear appropriate].





LABORATORY DATA: See below.





IMAGING: [CXR: FINDINGS:


The lungs are well inflated.  There is subtle area of increased density above 

the


minor fissure in the right upper lobe.  Lung fields are otherwise clear.  P

leural


angles are sharp.  Heart size is normal.  Pulmonary vasculature is not 

increased.  No


bony abnormality.





IMPRESSION:


Subtle right upper lobe infiltrate suspected just above the minor fissure.  

Otherwise


negative portable chest x-ray.]





MICROBIOLOGY: Please see below. 





ASSESSMENT: [This is a 60 y/o female with a pmh of polycystic kidney disease s/p

 transplantation on immunomodulating antirejection therapy who presents to our 

ED on 9/20 with a cc of malaise, fever, nausea, vomiting that have been 

escalating over the past week.].


.


PLAN:


1. [COVID19 pneumonia


- Patietn showing small infiltrate on cxr - almost certainly d/t covid 19, 

suspicion for oppurtunistic bacterial infection low with no leukocytosis, sirs 

criteria


- Patient not hypoxic - does not meet criteria for hospital admission


- D/t patients immunocompromised status - will go ahead and give monoclonal anti

body infusion


- Carisivimab/imdevimab ordered - consent form will be filled out prior to 

infusion


- Risks/benefits of monoclonal antibody therapy discussed with patient who 

expressed understanding and wishes to move forward with treatment


- Will order medications in case of reaction


- Pending no complications, patient may go home after observation period after 

infusion is complete


- Renal diet].





Home Medications


No Active Prescriptions or Reported Meds





Allergies


Coded Allergies:  


     No Known Allergies (Unverified , 9/20/21)





A-FIB/CHADSVASC


A-FIB History


Current/History of A-Fib/PAF?:  No











TAMARA ARAUJO            Sep 20, 2021 19:59


GOLD HENDRIX MD                Sep 21, 2021 07:05

## 2021-09-21 ENCOUNTER — HOSPITAL ENCOUNTER (OUTPATIENT)
Dept: HOSPITAL 53 - M 4MAIN | Age: 61
End: 2021-09-21
Attending: INTERNAL MEDICINE
Payer: COMMERCIAL

## 2021-09-21 VITALS — DIASTOLIC BLOOD PRESSURE: 70 MMHG | SYSTOLIC BLOOD PRESSURE: 128 MMHG

## 2021-09-21 VITALS — DIASTOLIC BLOOD PRESSURE: 72 MMHG | SYSTOLIC BLOOD PRESSURE: 124 MMHG

## 2021-09-21 VITALS — DIASTOLIC BLOOD PRESSURE: 69 MMHG | SYSTOLIC BLOOD PRESSURE: 110 MMHG

## 2021-09-21 VITALS — SYSTOLIC BLOOD PRESSURE: 122 MMHG | DIASTOLIC BLOOD PRESSURE: 71 MMHG

## 2021-09-21 VITALS — WEIGHT: 161.16 LBS | HEIGHT: 68 IN | BODY MASS INDEX: 24.42 KG/M2

## 2021-09-21 VITALS — DIASTOLIC BLOOD PRESSURE: 66 MMHG | SYSTOLIC BLOOD PRESSURE: 113 MMHG

## 2021-09-21 VITALS — DIASTOLIC BLOOD PRESSURE: 56 MMHG | SYSTOLIC BLOOD PRESSURE: 113 MMHG

## 2021-09-21 DIAGNOSIS — U07.1: Primary | ICD-10-CM

## 2021-09-21 NOTE — ECGEPIP
[facility] - ED

                                       

                                       Test Date:    2021

Pat Name:     RITA SANCHEZ             Department:   

Patient ID:   [id number]                 Room:         -

Gender:       Female                   Technician:   AUREA

:          [id number]               Requested By: MAYANK SUE

Order Number: [id number]     Reading MD:   Shilpa Lamas

                                 Measurements

Intervals                              Axis          

Rate:         82                       P:            52

FL:           166                      QRS:          35

QRSD:         94                       T:            66

QT:           380                                    

QTc:          443                                    

                           Interpretive Statements

Normal sinus rhythm

Low voltage QRS

Cannot rule out Anteroseptal infarct , age undetermined

NSTTW abnormalities

No prior

Electronically Signed on 2021 13:53:49 EDT by Shilpa Lamas

## 2021-10-19 ENCOUNTER — HOSPITAL ENCOUNTER (OUTPATIENT)
Dept: HOSPITAL 53 - M ADAMS | Age: 61
End: 2021-10-19
Attending: FAMILY MEDICINE
Payer: COMMERCIAL

## 2021-10-19 ENCOUNTER — HOSPITAL ENCOUNTER (OUTPATIENT)
Dept: HOSPITAL 53 - M SFHCADAM | Age: 61
End: 2021-10-19
Attending: FAMILY MEDICINE
Payer: COMMERCIAL

## 2021-10-19 DIAGNOSIS — U07.1: Primary | ICD-10-CM

## 2021-10-19 DIAGNOSIS — J12.82: ICD-10-CM

## 2021-10-19 NOTE — REP
INDICATION:

COVID-19



COMPARISON:

09/20/2021



TECHNIQUE:

PA and lateral.



FINDINGS:

The mediastinum and cardiac silhouette are normal.  The lung fields are clear and

without acute consolidation, effusion, or pneumothorax.  The skeletal structures are

intact and normal.



IMPRESSION:

No acute cardiopulmonary process.





<Electronically signed by Abdon Goyal > 10/19/21 [id number]

## 2021-10-20 ENCOUNTER — HOSPITAL ENCOUNTER (OUTPATIENT)
Dept: HOSPITAL 53 - M PLALAB | Age: 61
End: 2021-10-20
Attending: FAMILY MEDICINE
Payer: COMMERCIAL

## 2021-10-20 DIAGNOSIS — Z11.52: Primary | ICD-10-CM

## 2021-10-20 LAB
ALBUMIN SERPL BCG-MCNC: 3.6 GM/DL (ref 3.2–5.2)
ALT SERPL W P-5'-P-CCNC: 24 U/L (ref 12–78)
BILIRUB SERPL-MCNC: 0.4 MG/DL (ref 0.2–1)
BUN SERPL-MCNC: 18 MG/DL (ref 7–18)
CALCIUM SERPL-MCNC: 9 MG/DL (ref 8.5–10.1)
CHLORIDE SERPL-SCNC: 103 MEQ/L (ref 98–107)
CO2 SERPL-SCNC: 29 MEQ/L (ref 21–32)
CREAT SERPL-MCNC: 1.12 MG/DL (ref 0.55–1.3)
CRP SERPL-MCNC: 0.3 MG/DL (ref 0–0.3)
FERRITIN SERPL-MCNC: 48 NG/ML (ref 8–252)
GFR SERPL CREATININE-BSD FRML MDRD: 53 ML/MIN/{1.73_M2} (ref 51–?)
GLUCOSE SERPL-MCNC: 101 MG/DL (ref 70–100)
HCT VFR BLD AUTO: 37.1 % (ref 36–47)
HGB BLD-MCNC: 12 G/DL (ref 12–15.5)
MCH RBC QN AUTO: 27 PG (ref 27–33)
MCHC RBC AUTO-ENTMCNC: 32.3 G/DL (ref 32–36.5)
MCV RBC AUTO: 83.6 FL (ref 80–96)
PLATELET # BLD AUTO: 186 10^3/UL (ref 150–450)
POTASSIUM SERPL-SCNC: 4.2 MEQ/L (ref 3.5–5.1)
PROT SERPL-MCNC: 6.7 GM/DL (ref 6.4–8.2)
RBC # BLD AUTO: 4.44 10^6/UL (ref 4–5.4)
SODIUM SERPL-SCNC: 136 MEQ/L (ref 136–145)
WBC # BLD AUTO: 3.2 10^3/UL (ref 4–10)

## 2021-10-31 ENCOUNTER — HOSPITAL ENCOUNTER (EMERGENCY)
Dept: HOSPITAL 53 - M ED | Age: 61
Discharge: LEFT BEFORE DISCHARGE | End: 2021-10-31
Payer: COMMERCIAL

## 2021-10-31 VITALS
WEIGHT: 158.73 LBS | SYSTOLIC BLOOD PRESSURE: 123 MMHG | HEIGHT: 68 IN | BODY MASS INDEX: 24.06 KG/M2 | DIASTOLIC BLOOD PRESSURE: 65 MMHG

## 2021-10-31 DIAGNOSIS — Z53.21: Primary | ICD-10-CM

## 2021-10-31 NOTE — CCD
Summarization of Episode Note

                             Created on: 10/15/2021



RITA REYES

External Reference #: [id number]

: 1960

Sex: Female



Demographics





                          Address                   [address] [place]  [address]

 

                          Home Phone                [phone]

 

                          Preferred Language        Unknown

 

                          Marital Status            Unknown

 

                          Buddhism Affiliation     Unknown

 

                          Race                      White

 

                          Ethnic Group              Not  or 





Author





                          Author                    [facility]

ems

 

                          Organization              [facility]

ems

 

                          Address                   Unknown

 

                          Phone                     Unavailable







Support





                Name            Relationship    Address         Phone

 

                    RITA REYES       GUAR                [address] [place]  [address]                    [phone]

 

                    Wander Reyes    ECON                [address] [place]  [address]                    [phone]







Care Team Providers





                    Care Team Member Name Role                Phone

 

                    Beth Butts    Unavailable         [phone]







PROBLEMS





          Type      Condition ICD9-CM Code NQG16-OA Code Onset Dates Condition S

tatus W/U 

Status              Risk                SNOMED Code         Notes

 

       Problem Kidney transplant status        Z94.0         Active confirmed   

     [id number]  

 

       Problem Unilateral hearing loss        H91.90        Active confirmed    

    [id number]  

 

       Problem Chronic fatigue        R53.82        Active confirmed        8422

9001  

 

       Problem Hypoglycemia        E16.2         Active confirmed        5541056

03  

 

       Problem Vaginal atrophy        N95.2         Active confirmed        2971

61636  

 

       Problem Urinary incontinence, overflow        N39.490        Active confi

rmed        [id number] 



 

       Problem Sleep disturbance        G47.9         Active confirmed        53

245183  

 

       Problem Acquired hyperlipoproteinemia        E78.5         Active confirm

ed        0072248  

 

       Problem Biotin deficiency disease        E53.8         Active confirmed  

      276006471  

 

       Problem Renal transplant recipient        Z94.0         Active confirmed 

       [id number]  

 

       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

irmed        0757114  

 

       Problem Skin cancer screening        Z12.83        Active confirmed      

  [id number]  

 

       Problem Actinic keratosis        L57.0         Active confirmed        20

5951066  

 

       Problem Cherry angioma        D18.01        Active confirmed        48739

01  

 

        Problem CKD (chronic kidney disease), stage III         N18.3           

Active  confirmed         

[id number]                                

 

       Problem Lichen simplex chronicus        L28.0         Active confirmed   

     25430460  

 

       Problem Melanocytic nevus of skin        D22.9         Active confirmed  

      546218051  

 

       Problem Tinnitus, unspecified laterality        H93.19        Active conf

irmed        62919855 



 

       Problem Leukopenia, unspecified type        D72.819        Active confirm

ed        08629050  

 

       Problem Seborrheic keratoses        L82.1         Active confirmed       

 004455132  

 

       Problem Seborrheic keratoses, inflamed        L82.0         Active confir

med        293590510  

 

       Problem Skin tag        L91.8         Active confirmed        172195035  

 

       Problem Lentigines        L81.4         Active confirmed        940802027

  







ALLERGIES

No Known Allergies



ENCOUNTERS from 1960 to 2021-10-15





             Encounter    Location     Date         Provider     Diagnosis

 

                    [facility]    [address] [place] [address] [phone] [place] [address] 13 Oct, 

2021                      Beth Butts            Skin cancer screening Z12.83

 ; Renal transplant recipient 

Z94.0 ; Lentigines L81.4 ; Idiopathic guttate hypomelanosis L81.8 ; Skin tag 
L91.8 ; Cherry angioma D18.01 ; Seborrheic keratoses L82.1 ; Seborrheic 
keratoses, inflamed L82.0 ; Actinic keratosis L57.0 ; Lichen simplex chronicus 
L28.0 and Melanocytic nevus of skin D22.9







IMMUNIZATIONS

No Information



SOCIAL HISTORY

Tobacco Use:



                    Social History Observation Description         Date

 

                    Details (start date - stop date) Never Smoker         



Sex Assigned At Birth:



                          Social History Observation Description

 

                          Sex Assigned At Birth     Unknown



Language:



                    Question            Answer              Notes

 

                    Languages spoken:   English              



Sexual Hx:



                    Question            Answer              Notes

 

                    Had sex in the last 12 months (vaginal, oral, or anal)? Yes 

                 

 

                    with                Men only             

 

                    Use protection?     Yes                  



Alcohol Screening:



                    Question            Answer              Notes

 

                    Did you have a drink containing alcohol in the past year? Ye

s                  

 

                    Points              1                    

 

                    Interpretation      Negative             

 

                          How often did you have six or more drinks on one occas

ion in the past year? 

Never (0 points)                         

 

                                        How many drinks did you have on a typica

l day when you were drinking in the past

year?                     1 or 2 (0 points)          

 

                          How often did you have a drink containing alcohol in t

he past year? Monthly or 

less (1 point)                           



Tobacco Use:



                    Question            Answer              Notes

 

                    Are you a:          never smoker         







REASON FOR REFERRAL

No Information



VITAL SIGNS





                    Weight              155.6 lbs           13 Oct, 2021

 

                    Weight-kg           70.58 kg            13 Oct, 2021

 

                    Height              68 in               13 Oct, 2021

 

                    BMI                 23.66 kg/m2         13 Oct, 2021

 

                    Blood pressure systolic 122 mm Hg           13 Oct, 2021

 

                    Blood pressure diastolic 76 mm Hg            13 Oct, 2021







MEDICATIONS





           Medication SIG (Take, Route, Frequency, Duration) Notes      Start Da

te End Date   

Status

 

           Mycelex    karthik orally Daily                                  Not-T

aking

 

           Mycophenolate Mofetil 500 MG 1 tab Orally Twice a day                

                  Active

 

           Tacrolimus 1 MG 3 in the am 4 pm Orally bid                          

        Active

 

           Biotin 1000 MCG 1 tablet Orally Once a day for 90 day(s)            0

8 Oct, 2020            Active

 

                          Omeprazole 20 MG          1 capsule 30 minutes before 

morning meal Orally Once a day as 

needed for 30 day(s)                 08 Oct, 2020                    Not-Taking

 

           Biotin     5000 Daily                                  Active

 

             Estradiol 0.1 MG/GM 1/2 gram Vaginal twice weekly for 90 day(s)    

            

                                        Not-Taking

 

                    Triamcinolone Acetonide 0.1 % 1 application Externally Twice

 a day for 10 days  

                    13 Oct, 2021                            Active

 

           Clotrimazole 10 MG 1 karthik Mouth/Throat Five times a day            

                      Not-Taking

 

           Docusate Sodium 100 MG 2 tabs Orally Daily                           

       Not-Taking







PROCEDURES

No Information



RESULTS

No Results



REASON FOR VISIT

FBSE



MEDICAL (GENERAL) HISTORY





                    Type                Description         Date

 

                          Medical History           polycystic kidney disease, s

/p kidney transplant [facility], 

nephrologist is Dr Thibodeaux ([facility])    

 

                          Medical History           hyperlipidemia--LDL>200 (

0), not treated,  (), pt 

declines statin tx                       

 

                    Medical History     tinnitus/unilateral hearing loss  

 

                    Medical History     + RF (low titer)   

 

                    Medical History     CKD 3, GFR ~ 40      

 

                    Surgical History    kidney transplant   

 

                    Surgical History    meniscus            

 

                    Surgical History    colonography (neg)--endoscopy not done d

ue to kidney size 

 

                    Hospitalization History No Hospitalization history informati

on  







Goals Section

No Information



Health Concerns

No Information



MEDICAL EQUIPMENT

No Information



MENTAL STATUS

No Information



FUNCTIONAL STATUS

No Information



ASSESSMENTS





             Encounter Date Diagnosis    Assessment Notes Treatment Notes Treatm

ent Clinical 

Notes

 

                13 Oct, 2021    Skin cancer screening (ICD-10 - Z12.83)         

        



                                        



Patient counseled on signs and symptoms of skin cancer including ABCDE's of 
Melanoma. Patient counseled to wear sunscreen or use sun protective clothing 
when outdoors. Avoid peak hours of sun between 10-2. Patient instructed to call 
with any new or changing lesions.

 

                13 Oct, 2021    Renal transplant recipient (ICD-10 - Z94.0)     

            



                                        





 

                13 Oct, 2021    Lentigines (ICD-10 - L81.4)                 



                                        



Benign Lesion Counseling. The patient was extensively counseled regarding the 
benign nature of the lesion but that skin cancer may arise in this area just as 
it would anywhere on their skin. For that reason, return to clinic was 
recommended for any acute changes, itching, burning, or bleeding. The patient 
was educated that benign lesions are not a covered insurance benefit and 
treatment would be elective and cosmetic. They expressed understanding.

 

                13 Oct, 2021    Idiopathic guttate hypomelanosis (ICD-10 - L81.8

)                 



                                        



Benign, reassurance

 

                13 Oct, 2021    Skin tag (ICD-10 - L91.8)                 



                                        



Benign, reassurance

 

                13 Oct, 2021    Cherry angioma (ICD-10 - D18.01)                

 



                                        



Benign, reassurance

 

                13 Oct, 2021    Seborrheic keratoses (ICD-10 - L82.1)           

      



                                        



Benign Lesion Counseling. The patient was extensively counseled regarding the 
benign nature of the lesion but that skin cancer may arise in this area just as 
it would anywhere on their skin. For that reason, return to clinic was 
recommended for any acute changes, itching, burning, or bleeding. The patient 
was educated that benign lesions are not a covered insurance benefit and 
treatment would be elective and cosmetic. They expressed understanding.

 

                13 Oct, 2021    Seborrheic keratoses, inflamed (ICD-10 - L82.0) 

                



                                        



Cryotherapy x [ THREE ] number of sites. Universal protocol was followed in 
compliance with [facility] standards. Patient was counseled 
regarding the indication for treatment (precancerous state for actinic keratosis
or cosmetic reasons if done for seborrheic keratoses, acrochordons or warts) as 
well as, the method and expected results to include compromise of the skin 
barrier, bleeding, scarring/white area, redness at site, lesion recurrence, and 
pain. Patient was consented to the risks and benefits of the procedure and gave 
informed consent. Lesion(s) with locations as indicated in the physical 
examination were treated. Lesion(s) were treated with 2 cycles of liquid 
nitrogen with a thaw time of at least ten seconds. Therapy was applied in a 
pulsed fashion to minimize collateral tissue injury. Patient was instructed to 
use Vaseline ointment to the area(s) until healed. Patient tolerated the 
procedure well and left in stable condition. Pain before and after the procedure
were assessed to not be significantly different than baseline.

 

                13 Oct, 2021    Actinic keratosis (ICD-10 - L57.0)              

   



                                        



Cryotherapy x [ ONE ] number of sites. Universal protocol was followed in 
compliance with [facility] standards. Patient was counseled 
regarding the indication for treatment (precancerous state for actinic keratosis
or cosmetic reasons if done for seborrheic keratoses, acrochordons or warts) as 
well as, the method and expected results to include compromise of the skin 
barrier, bleeding, scarring/white area, redness at site, lesion recurrence, and 
pain. Patient was consented to the risks and benefits of the procedure and gave 
informed consent. Lesion(s) with locations as indicated in the physical 
examination were treated. Lesion(s) were treated with 2 cycles of liquid 
nitrogen with a thaw time of at least ten seconds. Therapy was applied in a 
pulsed fashion to minimize collateral tissue injury. Patient was instructed to 
use Vaseline ointment to the area(s) until healed. Patient tolerated the 
procedure well and left in stable condition. Pain before and after the procedure
were assessed to not be significantly different than baseline.

 

                13 Oct, 2021    Lichen simplex chronicus (ICD-10 - L28.0)       

          



                                        





 

                13 Oct, 2021    Melanocytic nevus of skin (ICD-10 - D22.9)      

           



                                        



Reminded to avoid the sun and tanning, use sun screens regularly when spending 
time out-of-doors, and perform self-examinations monthly to watch for any change
in the appearance of your moles. If you notice any changes in color, appearance,
symptoms of moles, you should contact the office for an appointment to have 
change evaluated as soon as possible.







PLAN OF TREATMENT

Medication



                Medication Name Sig             Start Date      Stop Date

 

                    Triamcinolone Acetonide 0.1 % 1 application Externally Twice

 a day for 10 days 

13 Oct, 2021                             



Treatment Notes



                    Assessment          Notes               Clinical Notes

 

                    Skin cancer screening                     Patient counseled 

on signs and symptoms of skin cancer 

including ABCDE's of Melanoma. Patient counseled to wear sunscreen or use sun 
protective clothing when outdoors. Avoid peak hours of sun between 10-2. Patient
instructed to call with any new or changing lesions.

 

                    Lentigines                              Benign Lesion Counse

[place]. The patient was extensively counseled 

regarding the benign nature of the lesion but that skin cancer may arise in this
area just as it would anywhere on their skin. For that reason, return to clinic 
was recommended for any acute changes, itching, burning, or bleeding. The 
patient was educated that benign lesions are not a covered insurance benefit and
treatment would be elective and cosmetic. They expressed understanding.

 

                    Idiopathic guttate hypomelanosis                     Benign,

 reassurance

 

                    Skin tag                                Benign, reassurance

 

                    Finn angioma                          Benign, reassurance

 

                    Seborrheic keratoses                     Benign Lesion Couns

eling. The patient was extensively 

counseled regarding the benign nature of the lesion but that skin cancer may 
arise in this area just as it would anywhere on their skin. For that reason, 
return to clinic was recommended for any acute changes, itching, burning, or 
bleeding. The patient was educated that benign lesions are not a covered 
insurance benefit and treatment would be elective and cosmetic. They expressed 
understanding.

 

                    Seborrheic keratoses, inflamed                     Cryothera

py x [ THREE ] number of sites. 

Universal protocol was followed in compliance with [facility] 
standards. Patient was counseled regarding the indication for treatment 
(precancerous state for actinic keratosis or cosmetic reasons if done for 
seborrheic keratoses, acrochordons or warts) as well as, the method and expected
results to include compromise of the skin barrier, bleeding, scarring/white 
area, redness at site, lesion recurrence, and pain. Patient was consented to the
risks and benefits of the procedure and gave informed consent. Lesion(s) with 
locations as indicated in the physical examination were treated. Lesion(s) were 
treated with 2 cycles of liquid nitrogen with a thaw time of at least ten 
seconds. Therapy was applied in a pulsed fashion to minimize collateral tissue 
injury. Patient was instructed to use Vaseline ointment to the area(s) until 
healed. Patient tolerated the procedure well and left in stable condition. Pain 
before and after the procedure were assessed to not be significantly different 
than baseline.

 

                    Actinic keratosis                       Cryotherapy x [ ONE 

] number of sites. Universal protocol 

was followed in compliance with [facility] standards. Patient was 
counseled regarding the indication for treatment (precancerous state for actinic
keratosis or cosmetic reasons if done for seborrheic keratoses, acrochordons or 
warts) as well as, the method and expected results to include compromise of the 
skin barrier, bleeding, scarring/white area, redness at site, lesion recurrence,
and pain. Patient was consented to the risks and benefits of the procedure and 
gave informed consent. Lesion(s) with locations as indicated in the physical 
examination were treated. Lesion(s) were treated with 2 cycles of liquid 
nitrogen with a thaw time of at least ten seconds. Therapy was applied in a 
pulsed fashion to minimize collateral tissue injury. Patient was instructed to 
use Vaseline ointment to the area(s) until healed. Patient tolerated the 
procedure well and left in stable condition. Pain before and after the procedure
were assessed to not be significantly different than baseline.

 

                    Melanocytic nevus of skin                     Reminded to av

oid the sun and tanning, use sun 

screens regularly when spending time out-of-doors, and perform self-examinations
monthly to watch for any change in the appearance of your moles. If you notice 
any changes in color, appearance, symptoms of moles, you should contact the 
office for an appointment to have change evaluated as soon as possible.



Next Appt



                                        Details

 

                                        Spring 2022 Reason:FBSE

 

                                        Provider Name:Arias Gutierres, 2021-10

-19 02:30:00 PM, [id number]  [address], 

[phone], [place], [address], [phone]

 

                                        Provider Name:Arias Gutierres, 2022 10:30:00 AM, [id number]  [address], 

[phone], [place], [address], [phone]

 

                                        Provider Name:Beth Butts,  08:15:00 AM, [address] [place] [address], 

[phone], [place], [address], [phone]



Follow Up:Spring 2022FBSE



Insurance Providers





             Payer Name   Payer Address Payer Phone  Insured Name Patient Relati

onship to 

Insured                   Coverage Start Date       Coverage End Date

 

                    [employer] SUZIE JAIN [id number] [facility] [address] [facility] [[email or web address]][place] [address] 

[phone]    RITA REYES self

## 2021-10-31 NOTE — CCD
Summarization of Episode Note

                             Created on: 10/09/2021



RITA REYES

External Reference #: [id number]

: 1960

Sex: Female



Demographics





                          Address                   [address] [place]  [address]

 

                          Home Phone                [phone]

 

                          Preferred Language        Unknown

 

                          Marital Status            Unknown

 

                          Yarsani Affiliation     Unknown

 

                          Race                      White

 

                          Ethnic Group              Not  or 





Author





                          Author                    [facility]

ems

 

                          Organization              [facility]

ems

 

                          Address                   Unknown

 

                          Phone                     Unavailable







Support





                Name            Relationship    Address         Phone

 

                    RITA REYES       GUAR                [address] [place]  [address]                    [phone]

 

                    Wander Reyes    ECON                [address] [place]  [address]                    [phone]







Care Team Providers





                    Care Team Member Name Role                Phone

 

                    Medvero  Arias Unavailable         [phone]







PROBLEMS





          Type      Condition ICD9-CM Code BGF96-IA Code Onset Dates Condition S

tatus W/U 

Status              Risk                SNOMED Code         Notes

 

       Problem Tinnitus, unspecified laterality        H93.19        Active conf

irmed        [id number] 



 

        Problem CKD (chronic kidney disease), stage III         N18.3           

Active  confirmed         

[id number]                                

 

       Problem Leukopenia, unspecified type        D72.819        Active confirm

ed        52087372  

 

       Problem Kidney transplant status        Z94.0         Active confirmed   

     [id number]  

 

       Problem Biotin deficiency disease        E53.8         Active confirmed  

      [id number]  

 

       Problem Acquired hyperlipoproteinemia        E78.5         Active confirm

ed        6848823  

 

       Problem Sleep disturbance        G47.9         Active confirmed        53

782112  

 

       Problem Unilateral hearing loss        H91.90        Active confirmed    

    55108251  

 

       Problem Hypoglycemia        E16.2         Active confirmed        2134761

03  

 

       Problem Chronic fatigue        R53.82        Active confirmed        8422

9001  

 

       Problem Vaginal atrophy        N95.2         Active confirmed        2971

19517  

 

       Problem Urinary incontinence, overflow        N39.490        Active confi

rmed        [id number] 









ALLERGIES

No Known Allergies



ENCOUNTERS from 1960 to 2021-10-08





             Encounter    Location     Date         Provider     Diagnosis

 

                [address] [phone] [place] [id number]

4 05 Oct, 2021    Arias Gutierres                               







IMMUNIZATIONS

No Information



SOCIAL HISTORY

Tobacco Use:



                    Social History Observation Description         Date

 

                    Details (start date - stop date) Never Smoker         



Sex Assigned At Birth:



                          Social History Observation Description

 

                          Sex Assigned At Birth     Unknown



Language:



                    Question            Answer              Notes

 

                    Languages spoken:   English              



Sexual Hx:



                    Question            Answer              Notes

 

                    Had sex in the last 12 months (vaginal, oral, or anal)? Yes 

                 

 

                    with                Men only             

 

                    Use protection?     Yes                  



Alcohol Screening:



                    Question            Answer              Notes

 

                    Did you have a drink containing alcohol in the past year? Ye

s                  

 

                    Points              1                    

 

                    Interpretation      Negative             

 

                          How often did you have six or more drinks on one occas

ion in the past year? 

Never (0 points)                         

 

                                        How many drinks did you have on a typica

l day when you were drinking in the past

year?                     1 or 2 (0 points)          

 

                          How often did you have a drink containing alcohol in t

he past year? Monthly or 

less (1 point)                           



Tobacco Use:



                    Question            Answer              Notes

 

                    Are you a:          never smoker         







REASON FOR REFERRAL

No Information



VITAL SIGNS

No information



MEDICATIONS





           Medication SIG (Take, Route, Frequency, Duration) Notes      Start Da

te End Date   

Status

 

           Biotin     5000 Daily                                  Active

 

           Mycelex    karthik orally Daily                                  Not-T

aking

 

           Biotin 1000 MCG 1 tablet Orally Once a day for 90 day(s)            0

8 Oct, 2020            Active

 

           Docusate Sodium 100 MG 2 tabs Orally Daily                           

       Active

 

                          Omeprazole 20 MG          1 capsule 30 minutes before 

morning meal Orally Once a day as 

needed for 30 day(s)                 08 Oct, 2020                    Active

 

           Mycophenolate Mofetil 500 MG 1 tab Orally Twice a day                

                  Active

 

           Tacrolimus 1 MG 3 in the am 4 pm Orally bid                          

        Active

 

           Clotrimazole 10 MG 1 karthik Mouth/Throat Five times a day            

                      Not-Taking

 

             Estradiol 0.1 MG/GM 1/2 gram Vaginal twice weekly for 90 day(s)    

            

                                        Not-Taking







PROCEDURES

No Information



RESULTS

No Results



REASON FOR VISIT

labs /covid



MEDICAL (GENERAL) HISTORY





                    Type                Description         Date

 

                          Medical History           polycystic kidney disease, s

/p kidney [facility], 

nephrologist is Dr Thibodeaux ([facility])    

 

                          Medical History           hyperlipidemia--LDL>200 (1/2

0), not treated,  (), pt 

declines statin tx                       

 

                    Medical History     tinnitus/unilateral hearing loss  

 

                    Medical History     + RF (low titer)   

 

                    Medical History     CKD 3, GFR ~ 40      

 

                    Surgical History    kidney transplant   

 

                    Surgical History    meniscus            

 

                    Surgical History    colonography (neg)--endoscopy not done d

ue to kidney size 

 

                    Hospitalization History No Hospitalization history informati

on  







Goals Section

No Information



Health Concerns

No Information



MEDICAL EQUIPMENT

No Information



MENTAL STATUS

No Information



FUNCTIONAL STATUS

No Information



ASSESSMENTS

No Information



PLAN OF TREATMENT

Next Appt



                                        Details

 

                                        Provider Name:Arias Murrietamarlo, 2021-10

-19 02:30:00 PM, [address], 

[phone], [place], [address], [phone]

 

                                        Provider Name:Norma Cruz, 2021 

02:45:00 PM, [address] [facility], 

[phone], [place], [address], [phone]

 

                                        Provider Name:Arias Gutierres, 2022 10:30:00 AM, [address], 

[phone], [place], [address], [phone]







Insurance Providers





             Payer Name   Payer Address Payer Phone  Insured Name Patient Relati

onship to 

Insured                   Coverage Start Date       Coverage End Date

 

                    [employer] SUZIE JAIN  [address] [facility] [place] [address] 

[phone]    RITA REYES self

## 2021-10-31 NOTE — CCD
Summarization of Episode Note

                             Created on: 2021



RITA REYES

External Reference #: [id number]

: 1960

Sex: Female



Demographics





                          Address                   [address] [place]  [address]

 

                          Home Phone                [phone]

 

                          Preferred Language        Unknown

 

                          Marital Status            Unknown

 

                          Druze Affiliation     Unknown

 

                          Race                      White

 

                          Ethnic Group              Not  or 





Author





                          Author                    [facility]

ems

 

                          Organization              [facility]

ems

 

                          Address                   Unknown

 

                          Phone                     Unavailable







Support





                Name            Relationship    Address         Phone

 

                    RITA REYES       GUAR                [address] [place]  [address]                    [phone]

 

                    Wander Reyes    ECON                [address] [place]  [address]                    [phone]







Care Team Providers





                    Care Team Member Name Role                Phone

 

                    Medvero  Arias Unavailable         [phone]







PROBLEMS





          Type      Condition ICD9-CM Code DMP31-OI Code Onset Dates Condition S

tatus W/U 

Status              Risk                SNOMED Code         Notes

 

       Problem Tinnitus, unspecified laterality        H93.19        Active conf

irmed        [id number] 



 

        Problem CKD (chronic kidney disease), stage III         N18.3           

Active  confirmed         

[id number]                                

 

       Problem Leukopenia, unspecified type        D72.819        Active confirm

ed        53319140  

 

       Problem Kidney transplant status        Z94.0         Active confirmed   

     937515401  

 

       Problem Biotin deficiency disease        E53.8         Active confirmed  

      862445145  

 

       Problem Acquired hyperlipoproteinemia        E78.5         Active confirm

ed        7562297  

 

       Problem Sleep disturbance        G47.9         Active confirmed        53

928916  

 

       Problem Unilateral hearing loss        H91.90        Active confirmed    

    24045722  

 

       Problem Hypoglycemia        E16.2         Active confirmed        7803868

03  

 

       Problem Chronic fatigue        R53.82        Active confirmed        8422

9001  

 

       Problem Vaginal atrophy        N95.2         Active confirmed        2971

[address]  

 

       Problem Urinary incontinence, overflow        N39.490        Active confi

rmed        206430265 









ALLERGIES

No Known Allergies



ENCOUNTERS from 1960 to 2021





             Encounter    Location     Date         Provider     Diagnosis

 

                [address] [phone] [place] [address]

4 20 Sep, 2021    Arias Gutierres                               







IMMUNIZATIONS

No Information



SOCIAL HISTORY

Tobacco Use:



                    Social History Observation Description         Date

 

                    Details (start date - stop date) Never Smoker         



Sex Assigned At Birth:



                          Social History Observation Description

 

                          Sex Assigned At Birth     Unknown



Language:



                    Question            Answer              Notes

 

                    Languages spoken:   English              



Sexual Hx:



                    Question            Answer              Notes

 

                    Had sex in the last 12 months (vaginal, oral, or anal)? Yes 

                 

 

                    with                Men only             

 

                    Use protection?     Yes                  



Alcohol Screening:



                    Question            Answer              Notes

 

                    Did you have a drink containing alcohol in the past year? Ye

s                  

 

                    Points              1                    

 

                    Interpretation      Negative             

 

                          How often did you have six or more drinks on one occas

ion in the past year? 

Never (0 points)                         

 

                                        How many drinks did you have on a typica

l day when you were drinking in the past

year?                     1 or 2 (0 points)          

 

                          How often did you have a drink containing alcohol in t

he past year? Monthly or 

less (1 point)                           



Tobacco Use:



                    Question            Answer              Notes

 

                    Are you a:          never smoker         







REASON FOR REFERRAL

No Information



VITAL SIGNS

No information



MEDICATIONS





           Medication SIG (Take, Route, Frequency, Duration) Notes      Start Da

te End Date   

Status

 

           Biotin     5000 Daily                                  Active

 

           Mycelex    karthik orally Daily                                  Not-T

aking

 

           Biotin 1000 MCG 1 tablet Orally Once a day for 90 day(s)            0

8 Oct, 2020            Active

 

           Docusate Sodium 100 MG 2 tabs Orally Daily                           

       Active

 

                          Omeprazole 20 MG          1 capsule 30 minutes before 

morning meal Orally Once a day as 

needed for 30 day(s)                 08 Oct, 2020                    Active

 

           Mycophenolate Mofetil 500 MG 1 tab Orally Twice a day                

                  Active

 

           Tacrolimus 1 MG 3 in the am 4 pm Orally bid                          

        Active

 

           Clotrimazole 10 MG 1 karthik Mouth/Throat Five times a day            

                      Not-Taking

 

             Estradiol 0.1 MG/GM 1/2 gram Vaginal twice weekly for 90 day(s)    

            

                                        Not-Taking







PROCEDURES

No Information



RESULTS

No Results



REASON FOR VISIT

covid + 



MEDICAL (GENERAL) HISTORY





                    Type                Description         Date

 

                          Medical History           polycystic kidney disease, s

/p kidney transplant [facility], 

nephrologist is Dr Thibodeaux ([facility])    

 

                          Medical History           hyperlipidemia--LDL>200 (1/2

0), not treated,  (), pt 

declines statin tx                       

 

                    Medical History     tinnitus/unilateral hearing loss  

 

                    Medical History     + RF (low titer)   

 

                    Medical History     CKD 3, GFR ~ 40      

 

                    Surgical History    kidney transplant   

 

                    Surgical History    meniscus            

 

                    Surgical History    colonography (neg)--endoscopy not done d

ue to kidney size 

 

                    Hospitalization History No Hospitalization history informati

on  







Goals Section

No Information



Health Concerns

No Information



MEDICAL EQUIPMENT

No Information



MENTAL STATUS

No Information



FUNCTIONAL STATUS

No Information



ASSESSMENTS

No Information



PLAN OF TREATMENT

Next Appt



                                        Details

 

                                        Provider Name:Norma Cruz, 2021 

02:45:00 PM, [address] [facility], 

[phone], [place], [address], [phone]

 

                                        Provider Name:Arias Gutierres, 2022 10:30:00 AM, [address]  [address], 

[phone], [place], [address], [phone]







Insurance Providers





             Payer Name   Payer Address Payer Phone  Insured Name Patient Relati

onship to 

Insured                   Coverage Start Date       Coverage End Date

 

                    [employer] SUZIE JAIN  [address] [facility]

[place] [address] 

[phone]    RITA REYES self

## 2021-10-31 NOTE — CCD
Summarization of Episode Note

                             Created on: 10/21/2021



RITA REYES

External Reference #: [id number]

: 1960

Sex: Female



Demographics





                          Address                   [address] [place]  [address]

 

                          Home Phone                [phone]

 

                          Preferred Language        Unknown

 

                          Marital Status            Unknown

 

                          Anglican Affiliation     Unknown

 

                          Race                      White

 

                          Ethnic Group              Not  or 





Author





                          Author                    [facility]

ems

 

                          Organization              [facility]

ems

 

                          Address                   Unknown

 

                          Phone                     Unavailable







Support





                Name            Relationship    Address         Phone

 

                    RITA REYES       GUAR                [address] [place]  [address]                    [phone]

 

                    Wander Reyes    ECON                [address] [place]  [address]                    [phone]







Care Team Providers





                    Care Team Member Name Role                Phone

 

                    Arias Gutierres Unavailable         [phone]







PROBLEMS





          Type      Condition ICD9-CM Code DKQ86-YI Code Onset Dates Condition S

tatus W/U 

Status              Risk                SNOMED Code         Notes

 

       Problem Hypoglycemia        E16.2         Active confirmed        3127393

03  

 

       Problem Kidney transplant status        Z94.0         Active confirmed   

     [id number]  

 

       Problem Urinary incontinence, overflow        N39.490        Active confi

rmed        [id number] 



 

       Problem Chronic fatigue        R53.82        Active confirmed        8422

9001  

 

       Problem Biotin deficiency disease        E53.8         Active confirmed  

      481527304  

 

       Problem Vaginal atrophy        N95.2         Active confirmed        2971

97336  

 

       Problem COVID-19        U07.1         Active confirmed        [id number]

2260967  

 

       Problem Tinnitus, unspecified laterality        H93.19        Active conf

irmed        62908622 



 

       Problem Sleep disturbance        G47.9         Active confirmed        53

034785  

 

       Problem Acquired hyperlipoproteinemia        E78.5         Active confirm

ed        6897944  

 

       Problem Renal transplant recipient        Z94.0         Active confirmed 

       [id number]  

 

       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

irmed        9608568  

 

       Problem Skin cancer screening        Z12.83        Active confirmed      

  469757735  

 

       Problem Actinic keratosis        L57.0         Active confirmed        20

0693857  

 

       Problem Cherry angioma        D18.01        Active confirmed        08150

01  

 

       Problem Leukopenia, unspecified type        D72.819        Active confirm

ed        66326637  

 

       Problem Lichen simplex chronicus        L28.0         Active confirmed   

     24971221  

 

       Problem Melanocytic nevus of skin        D22.9         Active confirmed  

      869675115  

 

        Problem CKD (chronic kidney disease), stage III         N18.3           

Active  confirmed         

369471063                                

 

       Problem Unilateral hearing loss        H91.90        Active confirmed    

    37781173  

 

       Problem Seborrheic keratoses        L82.1         Active confirmed       

 214385325  

 

       Problem Seborrheic keratoses, inflamed        L82.0         Active confir

med        873255650  

 

       Problem Skin tag        L91.8         Active confirmed        894396264  

 

       Problem Lentigines        L81.4         Active confirmed        993856006

  







ALLERGIES

No Known Allergies



ENCOUNTERS from 1960 to 2021-10-21





             Encounter    Location     Date         Provider     Diagnosis

 

                [place] [id number]  [address] [phone] [place] [address]

4 19 Oct, 2021    Arias Gutierres                              COVID-19 U07.1 ; Pneumonia due to Corona

virus disease 2019 J12.82 ; 

Left-sided chest pain R07.9 and Left-sided headache R51.9







IMMUNIZATIONS

No Information



SOCIAL HISTORY

Tobacco Use:



                    Social History Observation Description         Date

 

                    Details (start date - stop date) Never Smoker         



Sex Assigned At Birth:



                          Social History Observation Description

 

                          Sex Assigned At Birth     Unknown



Language:



                    Question            Answer              Notes

 

                    Languages spoken:   English              



Sexual Hx:



                    Question            Answer              Notes

 

                    Had sex in the last 12 months (vaginal, oral, or anal)? Yes 

                 

 

                    with                Men only             

 

                    Use protection?     Yes                  



Alcohol Screening:



                    Question            Answer              Notes

 

                    Did you have a drink containing alcohol in the past year? Ye

s                  

 

                    Points              1                    

 

                    Interpretation      Negative             

 

                          How often did you have six or more drinks on one occas

ion in the past year? 

Never (0 points)                         

 

                                        How many drinks did you have on a typica

l day when you were drinking in the past

year?                     1 or 2 (0 points)          

 

                          How often did you have a drink containing alcohol in t

he past year? Monthly or 

less (1 point)                           



Tobacco Use:



                    Question            Answer              Notes

 

                    Are you a:          never smoker         







REASON FOR REFERRAL

No Information



VITAL SIGNS





                    Weight              154 lbs             19 Oct, 2021

 

                    Height              68 in               19 Oct, 2021

 

                    BMI                 23.41 kg/m2         19 Oct, 2021

 

                    Heart Rate          94 /min             19 Oct, 2021

 

                    Respiratory Rate    20 /min             19 Oct, 2021

 

                    Temperature         97.5 degrees Fahrenheit 19 Oct, 2021

 

                    Oximetry            98                  19 Oct, 2021

 

                    Blood pressure systolic 116 mm Hg           19 Oct, 2021

 

                    Blood pressure diastolic 66 mm Hg            19 Oct, 2021







MEDICATIONS





           Medication SIG (Take, Route, Frequency, Duration) Notes      Start Da

te End Date   

Status

 

           Clotrimazole 10 MG 1 karthik Mouth/Throat Five times a day            

                      Not-Taking

 

                    Triamcinolone Acetonide 0.1 % 1 application Externally Twice

 a day for 10 days  

                    13 Oct, 2021                            Active

 

           Biotin 1000 MCG 1 tablet Orally Once a day for 90 day(s)            0

8 Oct, 2020            Active

 

           Tacrolimus 1 MG 3 in the am 4 pm Orally bid                          

        Active

 

           Mycophenolate Mofetil 500 MG 1 tab Orally Twice a day                

                  Active

 

           Biotin     5000 Daily                                  Active

 

                          Omeprazole 20 MG          1 capsule 30 minutes before 

morning meal Orally Once a day as 

needed for 30 day(s)                 08 Oct, 2020                    Not-Taking

 

             Estradiol 0.1 MG/GM 1/2 gram Vaginal twice weekly for 90 day(s)    

            

                                        Not-Taking

 

           Docusate Sodium 100 MG 2 tabs Orally Daily                           

       Not-Taking

 

           Mycelex    karthik orally Daily                                  Not-T

aking







PROCEDURES

No Information



RESULTS





                    Component           Value               Reference Range

 

                                        CBC - Complete Blood Count

Reviewed date:10/20/2021 16:08:21

Interpretation:

Performing Lab:[facility] [address] [address] [phone], Phone:[phone],[place] [address] 

 

                    WHITE BLOOD COUNT   3.2                 4.0-10.0

 

                    RED BLOOD COUNT     4.44                4.00-5.40

 

                    HEMOGLOBIN          12.0                12.0-15.5

 

                    HEMATOCRIT          37.1                36.0-47.0

 

                    MEAN CORPUSCULAR VOLUME 83.6                80.0-96.0

 

                    MEAN CORPUSCULAR HEMOGLOBIN 27.0                27.0-33.0

 

                    MEAN CORPUSCULAR HGB CONC 32.3                32.0-36.5

 

                    RED CELL DISTRIBUTION WIDTH 13.5                11.5-14.5

 

                    PLATELET COUNT, AUTOMATED 186                 150-450

 

                                        Comprehensive Metabolic Profile (CMP)

Reviewed date:10/20/2021 16:08:21

Interpretation:

Performing Lab:[facility] [address] [address] [phone], Phone:[phone],[place] [address] 

 

                    GLUCOSE, FASTING    101                 

 

                    BLOOD UREA NITROGEN 18                  7-18

 

                    CREATININE FOR GFR  1.12                0.55-1.30

 

                    GLOMERULAR FILTRATION RATE 53.0                >51

 

                    SODIUM LEVEL        136                 136-145

 

                    POTASSIUM SERUM     4.2                 3.5-5.1

 

                    CHLORIDE LEVEL      103                 

 

                    CARBON DIOXIDE LEVEL 29                  21-32

 

                    CALCIUM LEVEL       9.0                 8.5-10.1

 

                    AST/SGOT            18                  7-37

 

                    ALT/SGPT            24                  12-78

 

                    ALKALINE PHOSPHATASE 65                  

 

                    BILIRUBIN,TOTAL     0.4                 0.2-1.0

 

                    TOTAL PROTEIN       6.7                 6.4-8.2

 

                    ALBUMIN             3.6                 3.2-5.2

 

                    ALBUMIN/GLOBULIN RATIO 1.2                 1.2-2.2

 

                                        C REACTIVE PROTEIN QUANTITATIV (At [facility]

ab)

Reviewed date:10/20/2021 16:08:21

Interpretation:

Performing Lab:[facility] [address] [address] [phone], Phone:[phone],[place] [address] 

 

                    C REACTIVE PROTEIN QUANTITATIV 0.30                0.00-0.30

 

                                        DDIMER QUANT

Reviewed date:10/20/2021 16:08:21

Interpretation:

Performing Lab:[facility] [address] [address] [phone], Phone:[phone],[place] [address] 

 

                    D-DIMER QUANT       1407.80             <500

 

                                        FERRITIN

Reviewed date:10/20/2021 16:08:21

Interpretation:

Performing Lab:[facility] [address] [address] [phone], Phone:[phone],[facility] 

 

                    FERRITIN            48                  8-252







REASON FOR VISIT

covid  f/u



MEDICAL (GENERAL) HISTORY





                    Type                Description         Date

 

                          Medical History           polycystic kidney disease, s

/p kidney transplant [facility], 

nephrologist is Dr Thibodeaux ([facility])    

 

                          Medical History           hyperlipidemia--LDL>200 (12

0), not treated,  (), pt 

declines statin tx                       

 

                    Medical History     tinnitus/unilateral hearing loss  

 

                    Medical History     + RF (low titer)   

 

                    Medical History     CKD 3, GFR ~ 40      

 

                          Medical History           (mild) Covid pneumonia :

  +Covid test, small infiltrate on 

CXR, received monoclonals            

 

                    Surgical History    kidney transplant   

 

                    Surgical History    meniscus            

 

                    Surgical History    colonography (neg)--endoscopy not done d

ue to kidney size 

 

                    Hospitalization History No Hospitalization history informati

on  







Goals Section

No Information



Health Concerns

No Information



MEDICAL EQUIPMENT

No Information



MENTAL STATUS

No Information



FUNCTIONAL STATUS

No Information



ASSESSMENTS





             Encounter Date Diagnosis    Assessment Notes Treatment Notes Treatm

ent Clinical 

Notes

 

                19 Oct, 2021    COVID-19 (ICD-10 - U07.1)                 



                                        





 

                19 Oct, 2021    Pneumonia due to Coronavirus disease 2019 (ICD-1

0 - J12.82)                 



                                        





 

                19 Oct, 2021    Left-sided chest pain (ICD-10 - R07.9)          

       



                                        





 

                19 Oct, 2021    Left-sided headache (ICD-10 - R51.9)            

     



                                        











PLAN OF TREATMENT

Treatment Notes



                          Test Name                 Order Date

 

                          PLZ CHEST 2 VIEW          2021-10-19



Next Appt



                                        Details

 

                                        4 Weeks Reason:

 

                                        Provider Name:Arias Gutierres, 2021 02:30:00 PM, [address], 

[phone], [place], [address], [phone]

 

                                        Provider Name:Arias Gutierres, 2022 10:30:00 AM, [address]  [address], 

[phone], [place], [address], [phone]

 

                                        Provider Name:Beth Butts,  08:15:00 AM, [address] [place] [address], 

[phone], [place], [address], [phone]







Insurance Providers





             Payer Name   Payer Address Payer Phone  Insured Name Patient Relati

onship to 

Insured                   Coverage Start Date       Coverage End Date

 

                    [employer]BS SUZIE JAIN  [address] [facility] [place] [address] [phone]    RITA REYES self

## 2021-10-31 NOTE — CCD
Summarization of Episode Note

                             Created on: 2021



RITA REYES

External Reference #: [id number]

: 1960

Sex: Female



Demographics





                          Address                   [address] [place]  [address]

 

                          Home Phone                [phone]

 

                          Preferred Language        Unknown

 

                          Marital Status            Unknown

 

                          Yarsanism Affiliation     Unknown

 

                          Race                      White

 

                          Ethnic Group              Not  or 





Author





                          Author                    [facility]

ems

 

                          Organization              [facility]

ems

 

                          Address                   Unknown

 

                          Phone                     Unavailable







Support





                Name            Relationship    Address         Phone

 

                    RITA REYES       GUAR                [address] [place]  [address]                    [phone]

 

                    Wander Reyes    ECON                [address] [place]  [address]                    [phone]







Care Team Providers





                    Care Team Member Name Role                Phone

 

                    Medvero  Arias Unavailable         [phone]







PROBLEMS





          Type      Condition ICD9-CM Code MZS04-ED Code Onset Dates Condition S

tatus W/U 

Status              Risk                SNOMED Code         Notes

 

       Problem Tinnitus, unspecified laterality        H93.19        Active conf

irmed        [id number] 



 

        Problem CKD (chronic kidney disease), stage III         N18.3           

Active  confirmed         

[id number]                                

 

       Problem Leukopenia, unspecified type        D72.819        Active confirm

ed        74383376  

 

       Problem Kidney transplant status        Z94.0         Active confirmed   

     064011030  

 

       Problem Biotin deficiency disease        E53.8         Active confirmed  

      228412174  

 

       Problem Acquired hyperlipoproteinemia        E78.5         Active confirm

ed        7234870  

 

       Problem Sleep disturbance        G47.9         Active confirmed        53

380192  

 

       Problem Unilateral hearing loss        H91.90        Active confirmed    

    93316149  

 

       Problem Hypoglycemia        E16.2         Active confirmed        9885248

03  

 

       Problem Chronic fatigue        R53.82        Active confirmed        8422

9001  

 

       Problem Vaginal atrophy        N95.2         Active confirmed        2971

[address]  

 

       Problem Urinary incontinence, overflow        N39.490        Active confi

rmed        527883824 









ALLERGIES

No Known Allergies



ENCOUNTERS from 1960 to 2021





             Encounter    Location     Date         Provider     Diagnosis

 

                [address] [phone] [place] [id number]

4 02 Aug, 2021    Arias Gutierres                              Kidney transplant status Z94.0







IMMUNIZATIONS

No Information



SOCIAL HISTORY

Tobacco Use:



                    Social History Observation Description         Date

 

                    Details (start date - stop date) Never Smoker         



Sex Assigned At Birth:



                          Social History Observation Description

 

                          Sex Assigned At Birth     Unknown



Language:



                    Question            Answer              Notes

 

                    Languages spoken:   English              



Sexual Hx:



                    Question            Answer              Notes

 

                    Had sex in the last 12 months (vaginal, oral, or anal)? Yes 

                 

 

                    with                Men only             

 

                    Use protection?     Yes                  



Alcohol Screening:



                    Question            Answer              Notes

 

                    Did you have a drink containing alcohol in the past year? Ye

s                  

 

                    Points              1                    

 

                    Interpretation      Negative             

 

                          How often did you have six or more drinks on one occas

ion in the past year? 

Never (0 points)                         

 

                                        How many drinks did you have on a typica

l day when you were drinking in the past

year?                     1 or 2 (0 points)          

 

                          How often did you have a drink containing alcohol in t

he past year? Monthly or 

less (1 point)                           



Tobacco Use:



                    Question            Answer              Notes

 

                    Are you a:          never smoker         







REASON FOR REFERRAL

No Information



VITAL SIGNS

No information



MEDICATIONS





           Medication SIG (Take, Route, Frequency, Duration) Notes      Start Da

te End Date   

Status

 

           Biotin     5000 Daily                                  Active

 

           Mycelex    karthik orally Daily                                  Not-T

aking

 

           Biotin 1000 MCG 1 tablet Orally Once a day for 90 day(s)            0

8 Oct, 2020            Active

 

           Docusate Sodium 100 MG 2 tabs Orally Daily                           

       Active

 

                          Omeprazole 20 MG          1 capsule 30 minutes before 

morning meal Orally Once a day as 

needed for 30 day(s)                 08 Oct, 2020                    Active

 

           Mycophenolate Mofetil 500 MG 1 tab Orally Twice a day                

                  Active

 

           Tacrolimus 1 MG 3 in the am 4 pm Orally bid                          

        Active

 

           Clotrimazole 10 MG 1 karthik Mouth/Throat Five times a day            

                      Not-Taking

 

             Estradiol 0.1 MG/GM 1/2 gram Vaginal twice weekly for 90 day(s)    

            

                                        Not-Taking







PROCEDURES

No Information



RESULTS

No Results



REASON FOR VISIT

tacrolimus level/standing order. 



MEDICAL (GENERAL) HISTORY





                    Type                Description         Date

 

                          Medical History           polycystic kidney disease, s

/p kidney transplant [facility], 

nephrologist is Dr Thibodeaux ([facility])    

 

                          Medical History           hyperlipidemia--LDL>200 (1/2

0), not treated,  (), pt 

declines statin tx                       

 

                    Medical History     tinnitus/unilateral hearing loss  

 

                    Medical History     + RF (low titer)   

 

                    Medical History     CKD 3, GFR ~ 40      

 

                    Surgical History    kidney transplant   

 

                    Surgical History    meniscus            

 

                    Surgical History    colonography (neg)--endoscopy not done d

ue to kidney size 2012

 

                    Hospitalization History No Hospitalization history informati

on  







Goals Section

No Information



Health Concerns

No Information



MEDICAL EQUIPMENT

No Information



MENTAL STATUS

No Information



FUNCTIONAL STATUS

No Information



ASSESSMENTS





             Encounter Date Diagnosis    Assessment Notes Treatment Notes Treatm

ent Clinical 

Notes

 

                02 Aug, 2021    Kidney transplant status (ICD-10 - Z94.0)       

          



                                        











PLAN OF TREATMENT

Future Test



                          Test Name                 Order Date

 

                           [facility] (TACROLIMUS) 2021



Next Appt



                                        Details

 

                                        Provider Name:Norma Cruz, 2021 

02:45:00 PM, [address] [place] [address], 

[phone], [place], [address], [phone]

 

                                        Provider Name:Arias Gutierres, 2022 10:30:00 AM, [address], 

[phone], [place], [address], [phone]







Insurance Providers





             Payer Name   Payer Address Payer Phone  Insured Name Patient Relati

onship to 

Insured                   Coverage Start Date       Coverage End Date

 

                    [employer] UTIDICK JAIN  [address] [facility] [place] [address] [phone]    RITA REYES self

## 2021-10-31 NOTE — CCD
Summarization Of Episode

                             Created on: 10/31/2021



RITA REYES

External Reference #: [id number]

: 1960

Sex: Undifferentiated



Demographics





                          Address                   [address] [place] [address]

 

                          Home Phone                [phone]

 

                          Preferred Language        English

 

                          Marital Status            Unknown

 

                          Gnosticism Affiliation     Unknown

 

                          Race                      Unknown

 

                          Ethnic Group              Not  or 





Author





                          Author                    [facility]

 

                          Organization              [facility]

 

                          Address                   Unknown

 

                          Phone                     Unavailable







Support





                Name            Relationship    Address         Phone

 

                    SELF EMPLOYED       Next Of Kin         [address] [place] [address]                    [phone]

 

                    DR REYES          Next Of Kin         [address] [place] [address]                    [phone]

 

                    AV REYES  Next Of Kin         [address] [place] [address]                    [phone]

 

                    [place] [address]                    [phone]



                                  



Re-disclosure Warning

          The records that you are about to access may contain information from 
federally-assisted alcohol or drug abuse programs. If such information is 
present, then the following federally mandated warning applies: This information
has been disclosed to you from records protected by federal confidentiality 
rules (42 CFR part 2). The federal rules prohibit you from making any further 
disclosure of this information unless further disclosure is expressly permitted 
by the written consent of the person to whom it pertains or as otherwise 
permitted by 42 CFR part 2. A general authorization for the release of medical 
or other information is NOT sufficient for this purpose. The Federal rules 
restrict any use of the information to criminally investigate or prosecute any 
alcohol or drug abuse patient.The records that you are about to access may 
contain highly sensitive health information, the redisclosure of which is 
protected by Article 27-F of the [facility] Public Health law. If you 
continue you may have access to information: Regarding HIV / AIDS; Provided by 
facilities licensed or operated by the [facility] Office of Mental Health; 
or Provided by the [facility] Office for People With Developmental 
Disabilities. If such information is present, then the following [place] 
mandated warning applies: This information has been disclosed to you from 
confidential records which are protected by state law. State law prohibits you 
from making any further disclosure of this information without the specific 
written consent of the person to whom it pertains, or as otherwise permitted by 
law. Any unauthorized further disclosure in violation of state law may result in
a fine or assisted sentence or both. A general authorization for the release of 
medical or other information is NOT sufficient authorization for further disc
losure.                                                                         
    



Family History

          



             Family Member Name Family Member Gender Family Member Status Date o

f Status 

Description                             Data Source(s)

 

           Unknown    Unknown    Problem                          [facility]



                                                                                
       



Encounters

          



           Encounter  Providers  Location   Date       Indications Data Source(s

)

 

                Outpatient                      [address] [facility] [id number] 10/19/2021 12:00:00 AM

EDT                                                 eCW1 (Formerly [facility])

 

                Outpatient                      [address] [facility] [id number] 10/13/2021 12:00:00 AM

EDT                                                 eCW1 (Formerly [facility])

 

                Unknown                         [address] [facility] [id number] 10/05/2021 12:00:00 AM 

EDT                                                 eCW1 (Formerly [facility])

 

                    TeleMedicine Phone E/M by Phys 5-10 Min                     

[address] [place] [address] 

[id number]          2021 12:00:00 AM EDT                     eCW1 (Formerly [facility])

 

                Unknown                         [address] [facility] [id number] 2021 12:00:00 AM 

EDT                                                 eCW1 ([facility])

 

                Unknown                         [address] [facility] [id number] 2021 12:00:00 AM 

EDT                                                 eCW1 (Formerly [facility])

 

                Outpatient                      [address] [facility] [id number] 07/15/2021 12:00:00 AM

EDT                                                 eCW1 (Formerly [facility])

 

                Unknown                         [address] [facility] [id number] 2021 12:00:00 AM 

EDT                                                 eCW1 ([facility])

 

                Unknown                         [address] [facility] [id number] 2021 12:00:00 AM 

EDT                                                 eCW1 ([facility])

 

                Unknown                         [address] [facility] [address] 2021 12:00:00 AM 

EDT                                                 eCW1 (Formerly [facility])

 

                [place]                      [address] [facility] [address] 10/08/2020 12:00:00 AM

EDT                                                 eCW1 (Formerly [facility])

 

                [facility]                      [address] [facility] [address] 09/15/2020 12:00:00 AM

EDT                                                 eCW1 (Formerly [facility])



                                                                                
                                                                                
                                    



Medications

          



          Medication Brand Name Start Date Product Form Dose      Route     Admi

nistrative 

Instructions Pharmacy Instructions Status     Indications Reaction   Description

 Data 

Source(s)

 

                                        Triamcinolone Acetonide 0.001 MG/MG Topi

brandi Ointment Triamcinolone Acetonide 0.1

%            Triamcinolone Acetonide 0.1 % 10/13/2021 12:00:00 AM EDT           

   1.0 {application}  

                              active                        Triamcinolone Aceton

kiana 0.1 % eCW1 (Formerly [facility])

 

                                        Triamcinolone Acetonide 0.001 MG/MG Topi

brandi Ointment Triamcinolone Acetonide 0.1

%            Triamcinolone Acetonide 0.1 % 10/13/2021 12:00:00 AM EDT           

   1.0 {application}  

                              active                        Triamcinolone Aceton

kiana 0.1 % eCW1 (Formerly [facility])

 

                    Omeprazole 20 MG Delayed Release Oral Capsule Omeprazole 20 

MG    10/08/2020 

12:00:00 AM EDT                                    suspended               Omepr

azole 20 MG eCW1 (Formerly [facility])

 

                    Biotin 1 MG Oral Tablet Biotin 1000 MCG Biotin 1000 MCG     

10/08/2020 12:00:00 AM 

EDT           1.0 {tablet}                      active               Biotin 1000

 MCG eCW1 (Formerly [facility])

 

                    Omeprazole 20 MG Delayed Release Oral Capsule Omeprazole 20 

MG    10/08/2020 

12:00:00 AM EDT                                    active               Omeprazo

le 20 MG eCW1 (Formerly [facility])

 

                    Omeprazole 20 MG Delayed Release Oral Capsule Omeprazole 20 

MG    10/08/2020 

12:00:00 AM EDT                                    active               Omeprazo

le 20 MG eCW1 (Formerly [facility])

 

                    Biotin 1 MG Oral Tablet Biotin 1000 MCG Biotin 1000 MCG     

10/08/2020 12:00:00 AM 

EDT           1.0 {tablet}                      active               Biotin 1000

 MCG eCW1 (Former[facility])

 

                    Omeprazole 20 MG Delayed Release Oral Capsule Omeprazole 20 

MG    10/08/2020 

12:00:00 AM EDT                                    active               Omeprazo

le 20 MG eCW1 (Former[facility])

 

                    Biotin 1 MG Oral Tablet Biotin 1000 MCG Biotin 1000 MCG     

10/08/2020 12:00:00 AM 

EDT           1.0 {tablet}                      active               Biotin 1000

 MCG eCW1 (Formerly [facility])

 

                    Biotin 1 MG Oral Tablet Biotin 1000 MCG Biotin 1000 MCG     

10/08/2020 12:00:00 AM 

EDT           1.0 {tablet}                      active               Biotin 1000

 MCG eCW1 ([facility])

 

                    Biotin 1 MG Oral Tablet Biotin 1000 MCG Biotin 1000 MCG     

10/08/2020 12:00:00 AM 

EDT           1.0 {tablet}                      active               Biotin 1000

 MCG eCW1 ([facility])

 

                    Biotin 1 MG Oral Tablet Biotin 1000 MCG Biotin 1000 MCG     

10/08/2020 12:00:00 AM 

EDT           1.0 {tablet}                      active               Biotin 1000

 MCG eCW1 ([facility])

 

                    Omeprazole 20 MG Delayed Release Oral Capsule Omeprazole 20 

MG    10/08/2020 

12:00:00 AM EDT                                    active               Omeprazo

le 20 MG eCW1 ([facility])

 

                    Omeprazole 20 MG Delayed Release Oral Capsule Omeprazole 20 

MG    10/08/2020 

12:00:00 AM EDT                                    active               Omeprazo

le 20 MG eCW1 ([facility])

 

                    Biotin 1 MG Oral Tablet Biotin 1000 MCG Biotin 1000 MCG     

10/08/2020 12:00:00 AM 

EDT           1.0 {tablet}                      active               Biotin 1000

 MCG eCW1 ([facility])

 

                    Biotin 1 MG Oral Tablet Biotin 1000 MCG Biotin 1000 MCG     

10/08/2020 12:00:00 AM 

EDT           1.0 {tablet}                      active               Biotin 1000

 MCG eCW1 ([facility])

 

                    Omeprazole 20 MG Delayed Release Oral Capsule Omeprazole 20 

MG    10/08/2020 

12:00:00 AM EDT                                    active               Omeprazo

le 20 MG eCW1 ([facility])

 

                    Biotin 1 MG Oral Tablet Biotin 1000 MCG Biotin 1000 MCG     

10/08/2020 12:00:00 AM 

EDT           1.0 {tablet}                      active               Biotin 1000

 MCG eCW1 ([facility])

 

                    Biotin 1 MG Oral Tablet Biotin 1000 MCG Biotin 1000 MCG     

10/08/2020 12:00:00 AM 

EDT           1.0 {tablet}                      active               Biotin 1000

 MCG eCW1 ([facility])

 

                    Omeprazole 20 MG Delayed Release Oral Capsule Omeprazole 20 

MG    10/08/2020 

12:00:00 AM EDT                                    active               Omeprazo

le 20 MG eCW1 (Former[facility])

 

                    Omeprazole 20 MG Delayed Release Oral Capsule Omeprazole 20 

MG    10/08/2020 

12:00:00 AM EDT                                    suspended               Omepr

azole 20 MG eCW1 ([facility])

 

                    Omeprazole 20 MG Delayed Release Oral Capsule Omeprazole 20 

MG    10/08/2020 

12:00:00 AM EDT                                    active               Omeprazo

le 20 MG eCW1 ([facility])

 

                    Omeprazole 20 MG Delayed Release Oral Capsule Omeprazole 20 

MG    10/08/2020 

12:00:00 AM EDT                                    active               Omeprazo

le 20 MG eCW1 ([facility])

 

                    Biotin 1 MG Oral Tablet Biotin 1000 MCG Biotin 1000 MCG     

10/08/2020 12:00:00 AM 

EDT           1.0 {tablet}                      active               Biotin 1000

 MCG eCW1 ([facility])



                                                                                
                                                                                
                                                                                
                                                       



Insurance Providers

          



             Payer name   Policy type / Coverage type Policy ID    Covered party

 ID Covered 

party's relationship to steinberg Policy [employer]             Plan Information

 

                              [id number]                               [id number]

[id number]

 

          [facility] HMO/PPO/POS           [id number]           0   

                [id number]

 

          EXCELLUS  H         [id number]           Self                [id number]

[id number]

 

          [employer] [place]           [id number]           SP       

           [id number]

 

          [employer] [place]           [id number]           SP       

           [id number]

 

          [employer] [place] /[place]           [id number]           SP       

           [id number]

 

          B/S BLUE PPO/HSA (33)           [id number]           1              

     [id number]

 

          MEDICARE            [id number]           SP                  [id number]

T

 

          MEDICARE SECOND PAYER (88)           [id number]           1           

        [id number]

 

          [place] [facility]POS           [id number]           0   

                [id number]

 

          B/S BLUE PPO/HSA (33)           [id number]           1              

     [id number]

 

          EXCELLUS  H         [id number]           Spouse              [id number]

[id number]

 

          [employer] [place] /[place]           [id number]           [id number]     

           [id number]

 

          [employer] [place] /[place]           [id number]           SP       

           [id number]

 

          [employer]/806           [id number]           [id number]      

          [id number]

 

          EXCELLUS BCBS B         [id number]                   [id number]

[id number]

 

          [employer] [place]/806           [id number]           SP        

          [id number]

 

          [employer] [place]/806           [id number]           SP        

          [id number]

 

          [employer] [place] 306/806           [id number]           [id number]      

          [id number]

 

          [employer] [place] /[place]           [id number]           SP       

           [id number]

 

          SELF PAY ONLY           UNAVAILABLE           SP                  UNAV

AILABLE

 

          [employer] [facility] [employer] (HMO)           

[id number]     Self                 

 

          SPECIAL ACCOUNT (8)           UNAVAILABLE                             

  UNAVAILABLE

 

          MEDICARE            [id number]           SP                  [id number]

T



                                                                                
                                                                                
                                                                                
                                                                           



Problems, Conditions, and Diagnoses

          



           Code       Display Name Description Problem Type Effective Dates Data

 Source(s)

 

           U07.1      [id number] COVID-19   Problem    10/19/2021 12:00:

00 AM EDT eCW1 

(Formerly [facility])

 

           L81.4      [id number]  Lentigines Problem    10/13/2021 12:00:00 AM ED

T eCW1 (Former[facility])

 

           L91.8      [id number]  Skin tag   Problem    10/13/2021 12:00:00 AM ED

T eCW1 ([facility])

 

             L82.0        [id number]    Seborrheic keratoses, inflamed Problem   

   10/13/2021 12:00:00 AM 

EDT                                     eCW1 ([facility])

 

           L82.1      [id number]  Seborrheic keratoses Problem    10/13/2021 12:0

0:00 AM EDT eCW1 

([facility])

 

           D22.9      [id number]  Melanocytic nevus of skin Problem    10/13/2021

 12:00:00 AM EDT 

eCW1 ([facility])

 

           L28.0      [id number]   Lichen simplex chronicus Problem    10/13/2021 

12:00:00 AM EDT eCW1 

([facility])

 

           D18.01     [id number]    Cherry angioma Problem    10/13/2021 12:00:00 A

M EDT eCW1 ([facility])

 

           L57.0      189994311  Actinic keratosis Problem    10/13/2021 12:00:0

0 AM EDT eCW1 

([facility])

 

           Z12.83     [id number]  Skin cancer screening Problem    10/13/2021 12:

00:00 AM EDT eCW1 

([facility])

 

             L81.8        [id number]      Idiopathic guttate hypomelanosis Problem 

     10/13/2021 12:00:00 AM 

EDT                                     eCW1 ([facility])

 

           Z94.0      [id number]  Renal transplant recipient Problem    10/13/202

1 12:00:00 AM EDT 

eCW1 ([facility])

 

           G47.9      [id number]   Sleep disturbance Problem    10/08/2020 12:00:0

0 AM EDT eCW1 

([facility])

 

           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

 12:00:00 AM EDT 

eCW1 ([facility])



                                                                                
                                                                                
                                                                  



Surgeries/Procedures

          No Information                                                        
                     



Results

          



                    ID                  Date                Data Source

 

                    FERRITIN            10/20/2021 12:00:00 AM EDT eCW1 (Former[facility])









          Name      Value     Range     Interpretation Code Description Data Sondra

rce(s) Supporting 

Document(s)

 

                    48        3-093               FERRITIN  eCW1 ([facility])  









                    ID                  Date                Data Source

 

                    DDIMER QUANT        10/20/2021 12:00:00 AM EDT eCW1 (Formerly [facility])









          Name      Value     Range     Interpretation Code Description Data Sondra

rce(s) Supporting 

Document(s)

 

                    1407.80   <500                D-DIMER QUANT eCW1 ([facility]ly [facility])  









                    ID                  Date                Data Source

 

                    C REACTIVE PROTEIN QUANTITATIV (At [facility]) 10/20/2021 12:00

:00 AM EDT eCW1 

(Formerly [facility])









          Name      Value     Range     Interpretation Code Description Data Sondra

rce(s) Supporting 

Document(s)

 

                      0.30       0.00-0.30             C REACTIVE PROTEIN QUANTI

TATIV eCW1 ([facility])                                  









                    ID                  Date                Data Source

 

                    Comprehensive Metabolic Profile (CMP) 10/20/2021 12:00:00 AM

 EDT eCW1 ([facility])









          Name      Value     Range     Interpretation Code Description Data Sondra

rce(s) Supporting 

Document(s)

 

                    18        7-18                BLOOD UREA NITROGEN eCW1 ([facility])  

 

                    1.12      0.55-1.30           CREATININE FOR GFR eCW1 ([facility])  

 

                    101                     GLUCOSE, FASTING eCW1 (Former[facility])  

 

                    103                     CHLORIDE LEVEL eCW1 ([facility])  

 

                    53.0      >51                 GLOMERULAR FILTRATION RATE eCW

1 (Formerly [facility])  

 

                    4.2       3.5-5.1             POTASSIUM SERUM eCW1 ([facility])  

 

                    136       136-145             SODIUM LEVEL eCW1 ([facility])  

 

                    18        7-37                AST/SGOT  eCW1 ([facility])  

 

                    24        12-78               ALT/SGPT  eCW1 ([facility])  

 

                    9.0       8.5-10.1            CALCIUM LEVEL eCW1 ([facility])  

 

                    29        21-32               CARBON DIOXIDE LEVEL eCW1 ([facility])  

 

                    0.4       0.2-1.0             BILIRUBIN,TOTAL eCW1 ([facility])  

 

                    6.7       6.4-8.2             TOTAL PROTEIN eCW1 ([facility])  

 

                    65                      ALKALINE PHOSPHATASE eCW1 ([facility])  

 

                    3.6       3.2-5.2             ALBUMIN   eCW1 ([facility])  

 

                    1.2       1.2-2.2             ALBUMIN/GLOBULIN RATIO eCW1 ([facility])  









                    ID                  Date                Data Source

 

                    CBC - Complete Blood Count 10/20/2021 12:00:00 AM EDT eCW1 (

Formerly [facility])









          Name      Value     Range     Interpretation Code Description Data Sondra

rce(s) Supporting 

Document(s)

 

                    3.2       4.0-10.0            WHITE BLOOD COUNT eCW1 ([facility] [facility] [place])  

 

                    4.44      4.00-5.40           RED BLOOD COUNT eCW1 ([facility])  

 

                    37.1      36.0-47.0           HEMATOCRIT eCW1 ([facility])  

 

                    12.0      12.0-15.5           HEMOGLOBIN eCW1 ([facility])  

 

                      13.5       11.5-14.5             RED CELL DISTRIBUTION WID

TH eCW1 (Formerly [facility])                                  

 

                      32.3       32.0-36.5             MEAN CORPUSCULAR HGB CONC

 eCW1 (Formerly [facility])                                  

 

                      27.0       27.0-33.0             MEAN CORPUSCULAR HEMOGLOB

IN eCW1 (Formerly [facility])                                  

 

                      83.6       80.0-96.0             MEAN CORPUSCULAR VOLUME e

CW1 (Formerly [facility])

                                         

 

                    186       150-450             PLATELET COUNT, AUTOMATED eCW1

 (Formerly [facility]) 











                    ID                  Date                Data Source

 

                    [id number]            2021 06:00:00 PM EDT [facility]









          Name      Value     Range     Interpretation Code Description Data Sondra

rce(s) Supporting 

Document(s)

 

                                        SARS coronavirus 2 RNA [Presence] in Res

piratory specimen by [facility] with probe 

detection  POSITIVE                                    [facility]      

 

                                        This lab was ordered by [facility] a

nd reported by [facility].

 









                    ID                  Date                Data Source

 

                    ADM CHEST 2 VIEW    10/13/2020 01:05:05 PM EDT eCW1 (Formerly [facility])









          Name      Value     Range     Interpretation Code Description Data Sondra

rce(s) Supporting 

Document(s)

 

                                                  ADM CHEST 2 VIEW eCW1 (Formerly [facility])  







                                        Procedure

 

                                          



                                                                                
                                                                              



Social History

          



           Code       Duration   Value      Status     Description Data Source(s

)

 

           Smoking    10/19/2021 12:00:00 AM EDT Never Smoker completed  Never S

moker eCW1 

(Formerly [facility])

 

           Smoking    10/13/2021 12:00:00 AM EDT Never Smoker completed  Never S

moker eCW1 

(Formerly [facility])

 

           Smoking    07/15/2021 12:00:00 AM EDT Never Smoker completed  Never S

moker eCW1 

(Formerly [facility])

 

           Smoking    07/15/2021 12:00:00 AM EDT Never Smoker completed  Never S

moker eCW1 

(Formerly [facility])

 

           Smoking    07/15/2021 12:00:00 AM EDT Never Smoker completed  Never S

moker eCW1 

(Formerly [facility])

 

           Smoking    07/15/2021 12:00:00 AM EDT Never Smoker completed  Never S

moker eCW1 

([facility]ly [facility])

 

           Smoking    07/15/2021 12:00:00 AM EDT Never Smoker completed  Never S

moker eCW1 

(Formerly [facility])

 

           Smoking    10/08/2020 12:00:00 AM EDT Never Smoker completed  Never S

moker eCW1 

(Formerly [facility])

 

           Smoking    10/08/2020 12:00:00 AM EDT Never Smoker completed  Never S

moker eCW1 

(Formerly [facility])

 

           Smoking    10/08/2020 12:00:00 AM EDT Never Smoker completed  Never S

moker eCW1 

([facility]ly [facility])

 

           Smoking    10/08/2020 12:00:00 AM EDT Never Smoker completed  Never S

moker eCW1 

(Formerly [facility])



                                                                                
                                                                                
                                      



Vital Signs

          



                    ID                  Date                Data Source

 

                    UNK                                      









           Name       Value      Range      Interpretation Code Description Data

 Source(s)

 

           Body weight 154 [lb_av]                       154 [lb_av] eCW1 ([facility])

 

           Body height 68 [in_i]                        68 [in_i]  eCW1 (Former[facility])

 

           Body mass index (BMI) [Ratio] 23.41 kg/m2                       23.41

 kg/m2 eCW1 ([facility])

 

           Heart rate 94 /min                          94 /min    eCW1 ([facility])

 

           Respiratory rate 20 /min                          20 /min    eCW1 ([facility])

 

           Body temperature 97.5 [degF]                       97.5 [degF] eCW1 (

Formerly [facility])

 

           Systolic blood pressure 116 mm[Hg]                       116 mm[Hg] e

CW1 ([facility])

 

           Diastolic blood pressure 66 mm[Hg]                        66 mm[Hg]  

eCW1 ([facility])

 

           Diastolic blood pressure 76 mm[Hg]                        76 mm[Hg]  

eCW1 ([facility])

 

           Body weight 155.6 [lb_av]                       155.6 [lb_av] eCW1 ([facility])

 

           Body weight 70.58 kg                         70.58 kg   eCW1 ([facility])

 

           Body height 68 [in_i]                        68 [in_i]  eCW1 ([facility])

 

           Body mass index (BMI) [Ratio] 23.66 kg/m2                       23.66

 kg/m2 eCW1 ([facility])

 

           Systolic blood pressure 122 mm[Hg]                       122 mm[Hg] e

CW1 ([facility])

 

           Body weight 155 [lb_av]                       155 [lb_av] eCW1 ([facility])

 

           Body height                                   [in_i]    eCW1 ([facility])

 

           Body mass index (BMI) [Ratio] 23.57 kg/m2                       23.57

 kg/m2 eCW1 ([facility])

 

           Heart rate 98 /min                          98 /min    eCW1 ([facility])

 

           Respiratory rate 18 /min                          18 /min    eCW1 ([facility])

 

           Body temperature 96.9 [degF]                       96.9 [degF] eCW1 (

Formerly [facility])

 

           Systolic blood pressure 116 mm[Hg]                       116 mm[Hg] e

CW1 ([facility])

 

           Diastolic blood pressure 60 mm[Hg]                        60 mm[Hg]  

eCW1 ([facility])

 

           Body weight 155 [lb_av]                       155 [lb_av] eCW1 ([facility])

 

           Body mass index (BMI) [Ratio] 23.57 kg/m2                       23.57

 kg/m2 eCW1 ([facility])

 

           Heart rate 101 /min                         101 /min   eCW1 ([facility])

 

           Respiratory rate 18 /min                          18 /min    eCW1 ([facility])

 

           Body height                                   [in_i]    eCW1 ([facility])

 

           Body temperature 97.6 [degF]                       97.6 [degF] eCW1 (

Formerly [facility])

 

           Systolic blood pressure 110 mm[Hg]                       110 mm[Hg] e

CW1 ([facility])

 

           Diastolic blood pressure 68 mm[Hg]                        68 mm[Hg]  

eCW1 ([facility])



                                                                                
                  



Patient Treatment Plan of Care

          



             Planned Activity Planned Date Details      Description  Data Source

(s)

 

                    Triamcinolone Acetonide 0.001 MG/MG Topical Ointment 10/13/2

021 12:00:00 AM EDT 

                                                    eCW1 (Former[facility])

 

             Omeprazole 20 MG Delayed Release Oral Capsule 10/08/2020 12:00:00 A

M EDT                           

eCW1 ([facility])

 

             Biotin 1 MG Oral Tablet 10/08/2020 12:00:00 AM EDT                 

          eCW1 ([facility])

 

             Omeprazole 20 MG Delayed Release Oral Capsule 10/08/2020 12:00:00 A

M EDT                           

eCW1 ([facility])

 

             Biotin 1 MG Oral Tablet 10/08/2020 12:00:00 AM EDT                 

          eCW1 ([facility])

 

             Omeprazole 20 MG Delayed Release Oral Capsule 10/08/2020 12:00:00 A

M EDT                           

eCW1 ([facility])

 

             Biotin 1 MG Oral Tablet 10/08/2020 12:00:00 AM EDT                 

          eCW1 ([facility])

 

             Omeprazole 20 MG Delayed Release Oral Capsule 10/08/2020 12:00:00 A

M EDT                           

eCW1 ([facility])

 

             Biotin 1 MG Oral Tablet 10/08/2020 12:00:00 AM EDT                 

          eCW1 ([facility])

## 2021-10-31 NOTE — CCD
Summarization of Episode Note

                             Created on: 2021



RITA REYES

External Reference #: [id number]

: 1960

Sex: Female



Demographics





                          Address                   [address] [place]  [address]

 

                          Home Phone                [phone]

 

                          Preferred Language        Unknown

 

                          Marital Status            Unknown

 

                          Ep[employer] Affiliation     Unknown

 

                          Race                      White

 

                          Ethnic Group              Not  or 





Author





                          Author                    [facility]

ems

 

                          Organization              [facility]

ems

 

                          Address                   Unknown

 

                          Phone                     Unavailable







Support





                Name            Relationship    Address         Phone

 

                    RITA REYES       GUAR                [address] [place]  [address]                    [phone]

 

                    Wander Reyes    ECON                [address] [place]  [address]                    [phone]







Care Team Providers





                    Care Team Member Name Role                Phone

 

                    ErieDylan jaimes      Unavailable         [phone]







PROBLEMS





          Type      Condition ICD9-CM Code DHN16-EL Code Onset Dates Condition S

tatus W/U 

Status              Risk                SNOMED Code         Notes

 

       Problem Tinnitus, unspecified laterality        H93.19        Active conf

irmed        [id number] 



 

        Problem CKD (chronic kidney disease), stage III         N18.3           

Active  confirmed         

[id number]                                

 

       Problem Leukopenia, unspecified type        D72.819        Active confirm

ed        39487025  

 

       Problem Kidney transplant status        Z94.0         Active confirmed   

     [id number]  

 

       Problem Biotin deficiency disease        E53.8         Active confirmed  

      [id number]  

 

       Problem Acquired hyperlipoproteinemia        E78.5         Active confirm

ed        3341428  

 

       Problem Sleep disturbance        G47.9         Active confirmed        53

602381  

 

       Problem Unilateral hearing loss        H91.90        Active confirmed    

    [id number]  

 

       Problem Hypoglycemia        E16.2         Active confirmed        4919320

03  

 

       Problem Chronic fatigue        R53.82        Active confirmed        8422

9001  

 

       Problem Vaginal atrophy        N95.2         Active confirmed        2971

92271  

 

       Problem Urinary incontinence, overflow        N39.490        Active confi

rmed        [id number] 









ALLERGIES

No Known Allergies



ENCOUNTERS from 1960 to 2021





             Encounter    Location     Date         Provider     Diagnosis

 

                          [facility] Resident         [address] 

[phone] [place] [address]

                    22 Sep, 2021        Dylan Devries        COVID-19 U07.1







IMMUNIZATIONS

No Information



SOCIAL HISTORY

Tobacco Use:



                    Social History Observation Description         Date

 

                    Details (start date - stop date) Never Smoker         



Sex Assigned At Birth:



                          Social History Observation Description

 

                          Sex Assigned At Birth     Unknown



Language:



                    Question            Answer              Notes

 

                    Languages spoken:   English              



Sexual Hx:



                    Question            Answer              Notes

 

                    Had sex in the last 12 months (vaginal, oral, or anal)? Yes 

                 

 

                    with                Men only             

 

                    Use protection?     Yes                  



Alcohol Screening:



                    Question            Answer              Notes

 

                    Did you have a drink containing alcohol in the past year? Ye

s                  

 

                    Points              1                    

 

                    Interpretation      Negative             

 

                          How often did you have six or more drinks on one occas

ion in the past year? 

Never (0 points)                         

 

                                        How many drinks did you have on a typica

l day when you were drinking in the past

year?                     1 or 2 (0 points)          

 

                          How often did you have a drink containing alcohol in t

he past year? Monthly or 

less (1 point)                           



Tobacco Use:



                    Question            Answer              Notes

 

                    Are you a:          never smoker         







REASON FOR REFERRAL

No Information



VITAL SIGNS

No information



MEDICATIONS





           Medication SIG (Take, Route, Frequency, Duration) Notes      Start Da

te End Date   

Status

 

           Biotin     5000 Daily                                  Active

 

           Mycelex    karthik orally Daily                                  Not-T

aking

 

           Biotin 1000 MCG 1 tablet Orally Once a day for 90 day(s)            0

8 Oct, 2020            Active

 

           Docusate Sodium 100 MG 2 tabs Orally Daily                           

       Active

 

                          Omeprazole 20 MG          1 capsule 30 minutes before 

morning meal Orally Once a day as 

needed for 30 day(s)                 08 Oct, 2020                    Active

 

           Mycophenolate Mofetil 500 MG 1 tab Orally Twice a day                

                  Active

 

           Tacrolimus 1 MG 3 in the am 4 pm Orally bid                          

        Active

 

           Clotrimazole 10 MG 1 karthik Mouth/Throat Five times a day            

                      Not-Taking

 

             Estradiol 0.1 MG/GM 1/2 gram Vaginal twice weekly for 90 day(s)    

            

                                        Not-Taking







PROCEDURES

No Information



RESULTS

No Results



REASON FOR VISIT

F/U FOR ED COVID HOME MONITORING



MEDICAL (GENERAL) HISTORY





                    Type                Description         Date

 

                          Medical History           polycystic kidney disease, s

/p kidney transplant [facility], 

nephrologist is Dr Thibodeaux ([facility])    

 

                          Medical History           hyperlipidemia--LDL>200 (1/2

0), not treated,  (), pt 

declines statin tx                       

 

                    Medical History     tinnitus/unilateral hearing loss  

 

                    Medical History     + RF (low titer)   

 

                    Medical History     CKD 3, GFR ~ 40      

 

                    Surgical History    kidney transplant   

 

                    Surgical History    meniscus            

 

                    Surgical History    colonography (neg)--endoscopy not done d

ue to kidney size 

 

                    Hospitalization History No Hospitalization history informati

on  







Goals Section

No Information



Health Concerns

No Information



MEDICAL EQUIPMENT

No Information



MENTAL STATUS

No Information



FUNCTIONAL STATUS

No Information



ASSESSMENTS





             Encounter Date Diagnosis    Assessment Notes Treatment Notes Treatm

ent Clinical 

Notes

 

                22 Sep, 2021    COVID-19 (ICD-10 - U07.1)                 



You have been infected with coronavirus commonly known as Covid-19. 
Unfortunately, antibiotics do not help with viral infections. They are usually 
benign and self-limited infections, so treatment is based on symptomatic relief.
Rest and drink clear fluids throughout the day. You may use humidification and 
saline irrigation to help with the congestion. You may also take motrin or 
tylenol as needed for pain or fever. You are considered to be contagious at this
time. Please quarantine at home as directed. You likely will be contacted by 
public health who give you further direction viral infections sometimes may last
10-14 days before resolving completely. However, if symptoms are worsening 
especially if you develop chest pain or difficulty breathing; please be 
reevaluated as soon as possible.



Continue to monitor your O2 sats twice daily. Go to the emergency room if they 
are consistently below 90 or if you develop chest pain. 



Onset of symptoms today 11, onset of diagnosis day 2

 

             22 Sep, 2021 Other                                  Medication/s di

scussed with patient and questions 

answered. RTC as needed for worsening or unresolved symptoms. Patient states 
understanding and agreement with this plan.







PLAN OF TREATMENT

Treatment Notes



                    Assessment          Notes               Clinical Notes

 

                          COVID-19                  You have been infected with 

coronavirus commonly known as Covid-19. 

Unfortunately, antibiotics do not help with viral infections. They are usually 
benign and self-limited infections, so treatment is based on symptomatic relief.
Rest and drink clear fluids throughout the day. You may use humidification and 
saline irrigation to help with the congestion. You may also take motrin or 
tylenol as needed for pain or fever. You are considered to be contagious at this
time. Please quarantine at home as directed. You likely will be contacted by 
public health who give you further direction viral infections sometimes may last
10-14 days before resolving completely. However, if symptoms are worsening 
especially if you develop chest pain or difficulty breathing; please be 
reevaluated as soon as possible.Continue to monitor your O2 sats twice daily. Go
to the emergency room if they are consistently below 90 or if you develop chest 
pain.                                   Onset of symptoms today 11, onset of sarah

gnosis day 2



Next Appt



                                        Details

 

                                        as needed Reason:

 

                                        Provider Name:Norma Cruz, 2021 

02:45:00 PM, [address] [place] [address], 

[phone], [place], [address], [phone]

 

                                        Provider Name:Arias Gutierres, 2022 10:30:00 AM, [address]  [address], 

[phone], [place], [address], [phone]







Insurance Providers





             Payer Name   Payer Address Payer Phone  Insured Name Patient Relati

onship to 

Insured                   Coverage Start Date       Coverage End Date

 

                    [facility] [phone]

[place] [address] 

[phone]    RITA REYES self

## 2021-11-03 ENCOUNTER — HOSPITAL ENCOUNTER (OUTPATIENT)
Dept: HOSPITAL 53 - M PLALAB | Age: 61
End: 2021-11-03
Attending: NURSE PRACTITIONER
Payer: COMMERCIAL

## 2021-11-03 DIAGNOSIS — N18.30: Primary | ICD-10-CM

## 2021-11-03 LAB
25(OH)D3 SERPL-MCNC: 31.9 NG/ML (ref 30–100)
APPEARANCE UR: CLEAR
BACTERIA UR QL AUTO: NEGATIVE
BASOPHILS # BLD AUTO: 0 10^3/UL (ref 0–0.2)
BASOPHILS NFR BLD AUTO: 1.1 % (ref 0–1)
BILIRUB UR QL STRIP.AUTO: NEGATIVE
BUN SERPL-MCNC: 24 MG/DL (ref 7–18)
CALCIUM SERPL-MCNC: 9.1 MG/DL (ref 8.5–10.1)
CHLORIDE SERPL-SCNC: 104 MEQ/L (ref 98–107)
CO2 SERPL-SCNC: 30 MEQ/L (ref 21–32)
CREAT SERPL-MCNC: 1.18 MG/DL (ref 0.55–1.3)
CREAT UR-MCNC: 24.1 MG/DL
EOSINOPHIL # BLD AUTO: 0.1 10^3/UL (ref 0–0.5)
EOSINOPHIL NFR BLD AUTO: 3.3 % (ref 0–3)
GFR SERPL CREATININE-BSD FRML MDRD: 49.9 ML/MIN/{1.73_M2} (ref 51–?)
GLUCOSE SERPL-MCNC: 100 MG/DL (ref 70–100)
GLUCOSE UR QL STRIP.AUTO: NEGATIVE MG/DL
HCT VFR BLD AUTO: 37.8 % (ref 36–47)
HGB BLD-MCNC: 11.8 G/DL (ref 12–15.5)
HGB UR QL STRIP.AUTO: NEGATIVE
KETONES UR QL STRIP.AUTO: NEGATIVE MG/DL
LEUKOCYTE ESTERASE UR QL STRIP.AUTO: NEGATIVE
LYMPHOCYTES # BLD AUTO: 1.1 10^3/UL (ref 1.5–5)
LYMPHOCYTES NFR BLD AUTO: 39.3 % (ref 24–44)
MCH RBC QN AUTO: 26.5 PG (ref 27–33)
MCHC RBC AUTO-ENTMCNC: 31.2 G/DL (ref 32–36.5)
MCV RBC AUTO: 84.8 FL (ref 80–96)
MONOCYTES # BLD AUTO: 0.3 10^3/UL (ref 0–0.8)
MONOCYTES NFR BLD AUTO: 10.4 % (ref 2–8)
NEUTROPHILS # BLD AUTO: 1.2 10^3/UL (ref 1.5–8.5)
NEUTROPHILS NFR BLD AUTO: 45.9 % (ref 36–66)
NITRITE UR QL STRIP.AUTO: NEGATIVE
PH UR STRIP.AUTO: 6 UNITS (ref 5–9)
PLATELET # BLD AUTO: 190 10^3/UL (ref 150–450)
POTASSIUM SERPL-SCNC: 4.5 MEQ/L (ref 3.5–5.1)
PROT UR QL STRIP.AUTO: NEGATIVE MG/DL
PROT UR-MCNC: < 5 MG/DL (ref 0–12)
PTH-INTACT SERPL-MCNC: 29.8 PG/ML (ref 18.5–88)
RBC # BLD AUTO: 4.46 10^6/UL (ref 4–5.4)
RBC # UR AUTO: 0 /HPF (ref 0–3)
SODIUM SERPL-SCNC: 138 MEQ/L (ref 136–145)
SP GR UR STRIP.AUTO: 1 (ref 1–1.03)
SQUAMOUS #/AREA URNS AUTO: 0 /HPF (ref 0–6)
UROBILINOGEN UR QL STRIP.AUTO: 0.2 MG/DL (ref 0–2)
WBC # BLD AUTO: 2.7 10^3/UL (ref 4–10)
WBC #/AREA URNS AUTO: 0 /HPF (ref 0–3)

## 2021-11-17 ENCOUNTER — HOSPITAL ENCOUNTER (OUTPATIENT)
Dept: HOSPITAL 53 - M PLALAB | Age: 61
End: 2021-11-17
Attending: NURSE PRACTITIONER
Payer: COMMERCIAL

## 2021-11-17 DIAGNOSIS — N39.0: ICD-10-CM

## 2021-11-17 DIAGNOSIS — Z79.899: ICD-10-CM

## 2021-11-17 DIAGNOSIS — N18.30: Primary | ICD-10-CM

## 2021-11-17 DIAGNOSIS — B34.9: ICD-10-CM

## 2021-11-17 DIAGNOSIS — E55.9: ICD-10-CM

## 2021-11-17 DIAGNOSIS — Z94.0: ICD-10-CM

## 2021-11-17 LAB
25(OH)D3 SERPL-MCNC: 38 NG/ML (ref 30–100)
APPEARANCE UR: CLEAR
BACTERIA UR QL AUTO: NEGATIVE
BASOPHILS # BLD AUTO: 0 10^3/UL (ref 0–0.2)
BASOPHILS NFR BLD AUTO: 1 % (ref 0–1)
BILIRUB UR QL STRIP.AUTO: NEGATIVE
BUN SERPL-MCNC: 28 MG/DL (ref 7–18)
CALCIUM SERPL-MCNC: 8.7 MG/DL (ref 8.5–10.1)
CHLORIDE SERPL-SCNC: 105 MEQ/L (ref 98–107)
CO2 SERPL-SCNC: 29 MEQ/L (ref 21–32)
CREAT SERPL-MCNC: 1.29 MG/DL (ref 0.55–1.3)
CREAT UR-MCNC: 35.8 MG/DL
EOSINOPHIL # BLD AUTO: 0.1 10^3/UL (ref 0–0.5)
EOSINOPHIL NFR BLD AUTO: 4.2 % (ref 0–3)
GFR SERPL CREATININE-BSD FRML MDRD: 45 ML/MIN/{1.73_M2} (ref 51–?)
GLUCOSE SERPL-MCNC: 101 MG/DL (ref 70–100)
GLUCOSE UR QL STRIP.AUTO: NEGATIVE MG/DL
HCT VFR BLD AUTO: 36.8 % (ref 36–47)
HGB BLD-MCNC: 11.8 G/DL (ref 12–15.5)
HGB UR QL STRIP.AUTO: NEGATIVE
KETONES UR QL STRIP.AUTO: NEGATIVE MG/DL
LEUKOCYTE ESTERASE UR QL STRIP.AUTO: NEGATIVE
LYMPHOCYTES # BLD AUTO: 1.3 10^3/UL (ref 1.5–5)
LYMPHOCYTES NFR BLD AUTO: 46 % (ref 24–44)
MCH RBC QN AUTO: 27 PG (ref 27–33)
MCHC RBC AUTO-ENTMCNC: 32.1 G/DL (ref 32–36.5)
MCV RBC AUTO: 84.2 FL (ref 80–96)
MONOCYTES # BLD AUTO: 0.3 10^3/UL (ref 0–0.8)
MONOCYTES NFR BLD AUTO: 9.8 % (ref 2–8)
NEUTROPHILS # BLD AUTO: 1.1 10^3/UL (ref 1.5–8.5)
NEUTROPHILS NFR BLD AUTO: 39 % (ref 36–66)
NITRITE UR QL STRIP.AUTO: NEGATIVE
PH UR STRIP.AUTO: 5 UNITS (ref 5–9)
PLATELET # BLD AUTO: 186 10^3/UL (ref 150–450)
POTASSIUM SERPL-SCNC: 4.4 MEQ/L (ref 3.5–5.1)
PROT UR QL STRIP.AUTO: NEGATIVE MG/DL
PROT UR-MCNC: 7.9 MG/DL (ref 0–12)
PTH-INTACT SERPL-MCNC: 58.3 PG/ML (ref 18.5–88)
RBC # BLD AUTO: 4.37 10^6/UL (ref 4–5.4)
RBC # UR AUTO: 0 /HPF (ref 0–3)
SODIUM SERPL-SCNC: 138 MEQ/L (ref 136–145)
SP GR UR STRIP.AUTO: 1.01 (ref 1–1.03)
SQUAMOUS #/AREA URNS AUTO: 0 /HPF (ref 0–6)
UROBILINOGEN UR QL STRIP.AUTO: 0.2 MG/DL (ref 0–2)
WBC # BLD AUTO: 2.9 10^3/UL (ref 4–10)
WBC #/AREA URNS AUTO: 0 /HPF (ref 0–3)

## 2021-12-02 ENCOUNTER — HOSPITAL ENCOUNTER (OUTPATIENT)
Dept: HOSPITAL 53 - M PLALAB | Age: 61
End: 2021-12-02
Attending: NURSE PRACTITIONER
Payer: COMMERCIAL

## 2021-12-02 DIAGNOSIS — Z94.0: Primary | ICD-10-CM

## 2021-12-02 LAB
25(OH)D3 SERPL-MCNC: 35.2 NG/ML (ref 30–100)
APPEARANCE UR: CLEAR
BACTERIA UR QL AUTO: NEGATIVE
BASOPHILS # BLD AUTO: 0 10^3/UL (ref 0–0.2)
BASOPHILS NFR BLD AUTO: 0.8 % (ref 0–1)
BILIRUB UR QL STRIP.AUTO: NEGATIVE
BUN SERPL-MCNC: 25 MG/DL (ref 7–18)
CALCIUM SERPL-MCNC: 9.2 MG/DL (ref 8.5–10.1)
CHLORIDE SERPL-SCNC: 105 MEQ/L (ref 98–107)
CO2 SERPL-SCNC: 29 MEQ/L (ref 21–32)
CREAT SERPL-MCNC: 1.26 MG/DL (ref 0.55–1.3)
CREAT UR-MCNC: 50 MG/DL
EOSINOPHIL # BLD AUTO: 0.1 10^3/UL (ref 0–0.5)
EOSINOPHIL NFR BLD AUTO: 4.2 % (ref 0–3)
GFR SERPL CREATININE-BSD FRML MDRD: 46.3 ML/MIN/{1.73_M2} (ref 51–?)
GLUCOSE SERPL-MCNC: 115 MG/DL (ref 70–100)
GLUCOSE UR QL STRIP.AUTO: NEGATIVE MG/DL
HCT VFR BLD AUTO: 39.2 % (ref 36–47)
HGB BLD-MCNC: 12.3 G/DL (ref 12–15.5)
HGB UR QL STRIP.AUTO: NEGATIVE
KETONES UR QL STRIP.AUTO: NEGATIVE MG/DL
LEUKOCYTE ESTERASE UR QL STRIP.AUTO: NEGATIVE
LYMPHOCYTES # BLD AUTO: 1.1 10^3/UL (ref 1.5–5)
LYMPHOCYTES NFR BLD AUTO: 44.9 % (ref 24–44)
MCH RBC QN AUTO: 26.3 PG (ref 27–33)
MCHC RBC AUTO-ENTMCNC: 31.4 G/DL (ref 32–36.5)
MCV RBC AUTO: 83.9 FL (ref 80–96)
MONOCYTES # BLD AUTO: 0.2 10^3/UL (ref 0–0.8)
MONOCYTES NFR BLD AUTO: 8.1 % (ref 2–8)
NEUTROPHILS # BLD AUTO: 1 10^3/UL (ref 1.5–8.5)
NEUTROPHILS NFR BLD AUTO: 42 % (ref 36–66)
NITRITE UR QL STRIP.AUTO: NEGATIVE
PH UR STRIP.AUTO: 5 UNITS (ref 5–9)
PLATELET # BLD AUTO: 190 10^3/UL (ref 150–450)
POTASSIUM SERPL-SCNC: 4.5 MEQ/L (ref 3.5–5.1)
PROT UR QL STRIP.AUTO: NEGATIVE MG/DL
PROT UR-MCNC: 6.3 MG/DL (ref 0–12)
PTH-INTACT SERPL-MCNC: 31.1 PG/ML (ref 18.5–88)
RBC # BLD AUTO: 4.67 10^6/UL (ref 4–5.4)
RBC # UR AUTO: 0 /HPF (ref 0–3)
SODIUM SERPL-SCNC: 140 MEQ/L (ref 136–145)
SP GR UR STRIP.AUTO: 1.01 (ref 1–1.03)
SQUAMOUS #/AREA URNS AUTO: 0 /HPF (ref 0–6)
UROBILINOGEN UR QL STRIP.AUTO: 0.2 MG/DL (ref 0–2)
WBC # BLD AUTO: 2.4 10^3/UL (ref 4–10)
WBC #/AREA URNS AUTO: 1 /HPF (ref 0–3)

## 2022-03-11 ENCOUNTER — HOSPITAL ENCOUNTER (OUTPATIENT)
Dept: HOSPITAL 53 - M PLALAB | Age: 62
End: 2022-03-11
Attending: NURSE PRACTITIONER
Payer: COMMERCIAL

## 2022-03-11 DIAGNOSIS — N18.30: Primary | ICD-10-CM

## 2022-03-11 DIAGNOSIS — Z79.899: ICD-10-CM

## 2022-03-11 DIAGNOSIS — N39.0: ICD-10-CM

## 2022-03-11 DIAGNOSIS — Z94.0: ICD-10-CM

## 2022-03-11 LAB
25(OH)D3 SERPL-MCNC: 32.8 NG/ML (ref 30–100)
BASOPHILS # BLD AUTO: 0 10^3/UL (ref 0–0.2)
BASOPHILS NFR BLD AUTO: 1.4 % (ref 0–1)
BUN SERPL-MCNC: 28 MG/DL (ref 7–18)
CALCIUM SERPL-MCNC: 9 MG/DL (ref 8.8–10.2)
CHLORIDE SERPL-SCNC: 106 MEQ/L (ref 98–107)
CO2 SERPL-SCNC: 29 MEQ/L (ref 21–32)
CREAT SERPL-MCNC: 1.23 MG/DL (ref 0.55–1.3)
CREAT UR-MCNC: 27.7 MG/DL
EOSINOPHIL # BLD AUTO: 0.2 10^3/UL (ref 0–0.5)
EOSINOPHIL NFR BLD AUTO: 6.2 % (ref 0–3)
GFR SERPL CREATININE-BSD FRML MDRD: 47.4 ML/MIN/{1.73_M2} (ref 45–?)
GLUCOSE SERPL-MCNC: 95 MG/DL (ref 70–100)
HCT VFR BLD AUTO: 36.2 % (ref 36–47)
HGB BLD-MCNC: 11.6 G/DL (ref 12–15.5)
LYMPHOCYTES # BLD AUTO: 1.1 10^3/UL (ref 1.5–5)
LYMPHOCYTES NFR BLD AUTO: 38.8 % (ref 24–44)
MCH RBC QN AUTO: 26.6 PG (ref 27–33)
MCHC RBC AUTO-ENTMCNC: 32 G/DL (ref 32–36.5)
MCV RBC AUTO: 83 FL (ref 80–96)
MONOCYTES # BLD AUTO: 0.3 10^3/UL (ref 0–0.8)
MONOCYTES NFR BLD AUTO: 8.6 % (ref 2–8)
NEUTROPHILS # BLD AUTO: 1.3 10^3/UL (ref 1.5–8.5)
NEUTROPHILS NFR BLD AUTO: 44.7 % (ref 36–66)
PLATELET # BLD AUTO: 185 10^3/UL (ref 150–450)
POTASSIUM SERPL-SCNC: 4.7 MEQ/L (ref 3.5–5.1)
PROT UR-MCNC: < 5 MG/DL (ref 0–12)
PTH-INTACT SERPL-MCNC: 38.6 PG/ML (ref 18.5–88)
RBC # BLD AUTO: 4.36 10^6/UL (ref 4–5.4)
SODIUM SERPL-SCNC: 138 MEQ/L (ref 136–145)
WBC # BLD AUTO: 2.9 10^3/UL (ref 4–10)

## 2022-05-12 ENCOUNTER — HOSPITAL ENCOUNTER (OUTPATIENT)
Dept: HOSPITAL 53 - M PLALAB | Age: 62
End: 2022-05-12
Attending: NURSE PRACTITIONER
Payer: COMMERCIAL

## 2022-05-12 DIAGNOSIS — Z94.0: ICD-10-CM

## 2022-05-12 DIAGNOSIS — E34.9: ICD-10-CM

## 2022-05-12 DIAGNOSIS — E55.9: ICD-10-CM

## 2022-05-12 DIAGNOSIS — Z79.899: ICD-10-CM

## 2022-05-12 DIAGNOSIS — N18.30: Primary | ICD-10-CM

## 2022-05-12 DIAGNOSIS — N39.0: ICD-10-CM

## 2022-05-12 LAB
25(OH)D3 SERPL-MCNC: 28.1 NG/ML (ref 30–100)
APPEARANCE UR: CLEAR
BACTERIA UR QL AUTO: NEGATIVE
BASOPHILS # BLD AUTO: 0 10^3/UL (ref 0–0.2)
BASOPHILS NFR BLD AUTO: 1.3 % (ref 0–1)
BILIRUB UR QL STRIP.AUTO: NEGATIVE
BUN SERPL-MCNC: 33 MG/DL (ref 7–18)
CALCIUM SERPL-MCNC: 9.1 MG/DL (ref 8.8–10.2)
CHLORIDE SERPL-SCNC: 107 MEQ/L (ref 98–107)
CO2 SERPL-SCNC: 27 MEQ/L (ref 21–32)
CREAT SERPL-MCNC: 1.43 MG/DL (ref 0.55–1.3)
CREAT UR-MCNC: 49.7 MG/DL
EOSINOPHIL # BLD AUTO: 0.2 10^3/UL (ref 0–0.5)
EOSINOPHIL NFR BLD AUTO: 4.7 % (ref 0–3)
GFR SERPL CREATININE-BSD FRML MDRD: 39.8 ML/MIN/{1.73_M2} (ref 45–?)
GLUCOSE SERPL-MCNC: 81 MG/DL (ref 70–100)
GLUCOSE UR QL STRIP.AUTO: NEGATIVE MG/DL
HCT VFR BLD AUTO: 38.8 % (ref 36–47)
HGB BLD-MCNC: 12.5 G/DL (ref 12–15.5)
HGB UR QL STRIP.AUTO: NEGATIVE
KETONES UR QL STRIP.AUTO: NEGATIVE MG/DL
LEUKOCYTE ESTERASE UR QL STRIP.AUTO: NEGATIVE
LYMPHOCYTES # BLD AUTO: 1.2 10^3/UL (ref 1.5–5)
LYMPHOCYTES NFR BLD AUTO: 38.6 % (ref 24–44)
MCH RBC QN AUTO: 27.6 PG (ref 27–33)
MCHC RBC AUTO-ENTMCNC: 32.2 G/DL (ref 32–36.5)
MCV RBC AUTO: 85.7 FL (ref 80–96)
MONOCYTES # BLD AUTO: 0.3 10^3/UL (ref 0–0.8)
MONOCYTES NFR BLD AUTO: 8.2 % (ref 2–8)
NEUTROPHILS # BLD AUTO: 1.5 10^3/UL (ref 1.5–8.5)
NEUTROPHILS NFR BLD AUTO: 46.9 % (ref 36–66)
NITRITE UR QL STRIP.AUTO: NEGATIVE
PH UR STRIP.AUTO: 5 UNITS (ref 5–9)
PLATELET # BLD AUTO: 195 10^3/UL (ref 150–450)
POTASSIUM SERPL-SCNC: 4.3 MEQ/L (ref 3.5–5.1)
PROT UR QL STRIP.AUTO: NEGATIVE MG/DL
PROT UR-MCNC: < 5 MG/DL (ref 0–12)
PTH-INTACT SERPL-MCNC: 47.2 PG/ML (ref 18.5–88)
RBC # BLD AUTO: 4.53 10^6/UL (ref 4–5.4)
RBC # UR AUTO: 0 /HPF (ref 0–3)
SODIUM SERPL-SCNC: 140 MEQ/L (ref 136–145)
SP GR UR STRIP.AUTO: 1.01 (ref 1–1.03)
SQUAMOUS #/AREA URNS AUTO: 0 /HPF (ref 0–6)
UROBILINOGEN UR QL STRIP.AUTO: 0.2 MG/DL (ref 0–2)
WBC # BLD AUTO: 3.2 10^3/UL (ref 4–10)
WBC #/AREA URNS AUTO: 0 /HPF (ref 0–3)